# Patient Record
Sex: FEMALE | Race: ASIAN | NOT HISPANIC OR LATINO
[De-identification: names, ages, dates, MRNs, and addresses within clinical notes are randomized per-mention and may not be internally consistent; named-entity substitution may affect disease eponyms.]

---

## 2022-04-19 DIAGNOSIS — Z78.9 OTHER SPECIFIED HEALTH STATUS: ICD-10-CM

## 2022-04-19 DIAGNOSIS — N75.1 ABSCESS OF BARTHOLIN'S GLAND: ICD-10-CM

## 2022-04-19 DIAGNOSIS — N87.0 MILD CERVICAL DYSPLASIA: ICD-10-CM

## 2022-04-19 DIAGNOSIS — N92.5 OTHER SPECIFIED IRREGULAR MENSTRUATION: ICD-10-CM

## 2022-04-19 DIAGNOSIS — Z86.19 PERSONAL HISTORY OF OTHER INFECTIOUS AND PARASITIC DISEASES: ICD-10-CM

## 2022-04-19 PROBLEM — Z00.00 ENCOUNTER FOR PREVENTIVE HEALTH EXAMINATION: Status: ACTIVE | Noted: 2022-04-19

## 2022-04-21 ENCOUNTER — APPOINTMENT (OUTPATIENT)
Dept: OBGYN | Facility: CLINIC | Age: 22
End: 2022-04-21
Payer: COMMERCIAL

## 2022-04-21 VITALS
TEMPERATURE: 98.2 F | OXYGEN SATURATION: 98 % | WEIGHT: 86 LBS | BODY MASS INDEX: 15.24 KG/M2 | HEIGHT: 63 IN | RESPIRATION RATE: 16 BRPM | HEART RATE: 96 BPM | SYSTOLIC BLOOD PRESSURE: 106 MMHG | DIASTOLIC BLOOD PRESSURE: 82 MMHG

## 2022-04-21 DIAGNOSIS — Z01.419 ENCOUNTER FOR GYNECOLOGICAL EXAMINATION (GENERAL) (ROUTINE) W/OUT ABNORMAL FINDINGS: ICD-10-CM

## 2022-04-21 DIAGNOSIS — R87.622 LOW GRADE SQUAMOUS INTRAEPITHELIAL LESION ON CYTOLOGIC SMEAR OF VAGINA (LGSIL): ICD-10-CM

## 2022-04-21 PROCEDURE — 99213 OFFICE O/P EST LOW 20 MIN: CPT | Mod: 25

## 2022-04-21 NOTE — HISTORY OF PRESENT ILLNESS
[FreeTextEntry1] : 22 year G 0  \par LMP: 04/041/2021  Regular Menses\par No medication\par Birth control  Xulane patch. \par Sexually active with same partner.\par No complaints. \par Non smoker\par

## 2022-04-25 LAB
C TRACH RRNA SPEC QL NAA+PROBE: NOT DETECTED
N GONORRHOEA RRNA SPEC QL NAA+PROBE: NOT DETECTED
SOURCE TP AMPLIFICATION: NORMAL

## 2022-04-26 LAB — CYTOLOGY CVX/VAG DOC THIN PREP: ABNORMAL

## 2022-10-08 ENCOUNTER — EMERGENCY (EMERGENCY)
Facility: HOSPITAL | Age: 22
LOS: 1 days | Discharge: HOME | End: 2022-10-08
Attending: EMERGENCY MEDICINE | Admitting: EMERGENCY MEDICINE

## 2022-10-08 VITALS
TEMPERATURE: 98 F | HEART RATE: 89 BPM | DIASTOLIC BLOOD PRESSURE: 74 MMHG | SYSTOLIC BLOOD PRESSURE: 112 MMHG | RESPIRATION RATE: 16 BRPM | OXYGEN SATURATION: 100 %

## 2022-10-08 PROCEDURE — 99284 EMERGENCY DEPT VISIT MOD MDM: CPT

## 2022-10-08 RX ORDER — LIDOCAINE 4 G/100G
1 CREAM TOPICAL ONCE
Refills: 0 | Status: COMPLETED | OUTPATIENT
Start: 2022-10-08 | End: 2022-10-08

## 2022-10-08 RX ADMIN — LIDOCAINE 1 APPLICATION(S): 4 CREAM TOPICAL at 20:24

## 2022-10-08 NOTE — ED PROVIDER NOTE - OBJECTIVE STATEMENT
23 yo pmhx bartholin cysts presents to ed w one day of bartholin cyst left labia majora. denies fevers, or drainage to the area. pt  follows w dr. henriquez and normally gets them drained at [facility].

## 2022-10-08 NOTE — ED PROVIDER NOTE - PHYSICAL EXAMINATION
GENERAL: well appearing in no acute distress, non-toxic appearing  CARDIAC: regular rate and rhythm, normal S1S2,   PULM: normal breath sounds, clear to ascultation bilaterally, no rales, rhonchi, wheezing  GI: abdomen nondistended, soft, nontender, no guarding, rebound tenderness  : left labia majora bartholin cyst size of small golfball

## 2022-10-08 NOTE — ED PROVIDER NOTE - ATTENDING CONTRIBUTION TO CARE
Pt w/ recurrent bartholin cyst, L labia, does not appear infected, plan for gyn consult for drainage.

## 2022-10-08 NOTE — ED PROVIDER NOTE - CLINICAL SUMMARY MEDICAL DECISION MAKING FREE TEXT BOX
23 yo f no pmhx presents to ed w left labia majora bartholin cyst x1 day, with no drainage or fevers. this is her fifth cyst, has not had prior marsupialization. pt seen by dr. henriquez who was made aware she was here. gyn team will come down and drain

## 2022-10-08 NOTE — ED PROVIDER NOTE - PATIENT PORTAL LINK FT
You can access the FollowMyHealth Patient Portal offered by [facility] by registering at the following website: [email or web address]. By joining [employer]’s FollowMyHealth portal, you will also be able to view your health information using other applications (apps) compatible with our system.

## 2022-10-08 NOTE — ED PROVIDER NOTE - NS ED ROS FT
General: denies fever, chills  CV: denies chest pain, palpitations  Resp: denies difficulty breathing, cough  Abdominal: denies nausea, vomiting, diarrhea, abdominal pain  : bartholin cyst

## 2022-10-08 NOTE — CONSULT NOTE ADULT - SUBJECTIVE AND OBJECTIVE BOX
TIM DEE  22y  Female [id number]    HPI:      Name of GYN Physician:     ObHx:  GynHx: Denies fibroids, cysts, endometriosis, STI's, Abnormal pap smears   PMHx:  SurgHx:  Meds:  Allergies:  Social History:  Denies smoking use, drug use, alcohol use.    Vital Signs Last 24 Hrs  T(C): 36.6 (08 Oct 2022 15:53), Max: 36.6 (08 Oct 2022 15:53)  T(F): 97.9 (08 Oct 2022 15:53), Max: 97.9 (08 Oct 2022 15:53)  HR: 89 (08 Oct 2022 15:53) (89 - 89)  BP: 112/74 (08 Oct 2022 15:53) (112/74 - 112/74)  BP(mean): --  RR: 16 (08 Oct 2022 15:53) (16 - 16)  SpO2: 100% (08 Oct 2022 15:53) (100% - 100%)    Parameters below as of 08 Oct 2022 15:53  Patient On (Oxygen Delivery Method): room air        Physical Exam:   General: sitting comfortably in bed, NAD   HEENT: neck supple, full ROM  CV: RR S1S2 no m/r/g  Lungs: CTA b/l, good air flow b/l   Back: No CVA tenderness  Abd: Soft, non-tender, non-distended.  Bowel sounds present.    :  No bleeding on pad. External labia wnl. Bimanual exam with no cervical motion tenderness, uterus wnl, adnexa non palpable b/l.  Cervix closed vs. Cervix dilated *** cm   Speculum Exam: No active bleeding from os. Physiologic discharge.    Ext: non-tender b/l, no edema     Chaperoned by     LABS:                I&O's Detail          RADIOLOGY & ADDITIONAL STUDIES: TIM DEE  22y  Female [id number]    HPI:  23 y/o G0 LMP 8/3 presenting for recurrent Bartholin's abscess.    Patient states that over the past year she has had "at least 5 Bartholin's cysts" - currently the fourth on the left, and has had one on the right. Has had I&D every time, usually at [facility]. Presents today with feeling of growing cyst on L side of labia, started last night, noting more discomfort today. Denies drainage, difficulty urinating, fevers/chills, abnormal discharge. Otherwise feeling well. Has not needed to take pain medications. States that in between the recurrences she does try sitz baths with no effect.    Name of GYN Physician: Dr Johnson    ObHx: denies  GynHx: Denies fibroids, cysts, endometriosis, STI's, Abnormal pap smears   PMHx: migraines  SurgHx: denies  Meds: Ajovy qmonthly injections for migraines  Allergies: NKDA  Social History:  Denies smoking use, drug use, alcohol use.    Vital Signs Last 24 Hrs  T(C): 36.6 (08 Oct 2022 15:53), Max: 36.6 (08 Oct 2022 15:53)  T(F): 97.9 (08 Oct 2022 15:53), Max: 97.9 (08 Oct 2022 15:53)  HR: 89 (08 Oct 2022 15:53) (89 - 89)  BP: 112/74 (08 Oct 2022 15:53) (112/74 - 112/74)  BP(mean): --  RR: 16 (08 Oct 2022 15:53) (16 - 16)  SpO2: 100% (08 Oct 2022 15:53) (100% - 100%)    Parameters below as of 08 Oct 2022 15:53  Patient On (Oxygen Delivery Method): room air    Physical Exam:   General: sitting comfortably in bed, NAD   Abd: Soft, non-tender, non-distended.  :  No bleeding on pad. External labia wnl. 1.5cm slightly fluctuant cyst noted at 5 o;clock on L labia  Ext: non-tender b/l, no edema

## 2022-10-08 NOTE — CONSULT NOTE ADULT - ASSESSMENT
***INCOMPLETE PT TO BE SEEN*** 21 y/o G0 LMP 8/3 presenting for recurrent Bartholin's gland cyst.    #Bartholin's Cyst  - Incision and drainage in ED with attending Dr Jean; pt given local anesthetic with 2% lidocaine with epinephrine, minimal expression of white fluid, wound culture taken; patient tolerated well  - f/u outpatient with Dr Johnson  - No other acute Gyn intervention, primary management per ED    Patient seen and examined with attending Dr Ted Viveros  PGY-2

## 2022-10-10 LAB
CULTURE RESULTS: SIGNIFICANT CHANGE UP
SPECIMEN SOURCE: SIGNIFICANT CHANGE UP

## 2022-10-15 ENCOUNTER — LABORATORY RESULT (OUTPATIENT)
Age: 22
End: 2022-10-15

## 2022-10-15 ENCOUNTER — APPOINTMENT (OUTPATIENT)
Dept: OBGYN | Facility: CLINIC | Age: 22
End: 2022-10-15

## 2022-10-15 VITALS
DIASTOLIC BLOOD PRESSURE: 76 MMHG | BODY MASS INDEX: 14.53 KG/M2 | HEART RATE: 84 BPM | OXYGEN SATURATION: 100 % | TEMPERATURE: 98 F | SYSTOLIC BLOOD PRESSURE: 110 MMHG | HEIGHT: 63 IN | WEIGHT: 82 LBS | RESPIRATION RATE: 16 BRPM

## 2022-10-15 PROCEDURE — 99213 OFFICE O/P EST LOW 20 MIN: CPT | Mod: 25

## 2022-10-15 NOTE — HISTORY OF PRESENT ILLNESS
[FreeTextEntry1] : TIM DEE 23 YO, presents for Repeat pap & Hospital Follow up\par  G 0\par LMP: 08/03/2022   Irregular Menses\par Medication- Ajovy for Migraines\par Sexually active with same partner using condoms. \par Last PAP-LGSIL\par Visited [facility]- ED on 10/08 for Bartholin abscess. \par Had I/D with Dr. Barrera. \par No complaints. \par

## 2022-10-21 PROBLEM — Z78.9 OTHER SPECIFIED HEALTH STATUS: Chronic | Status: ACTIVE | Noted: 2022-10-08

## 2022-10-27 LAB — CYTOLOGY CVX/VAG DOC THIN PREP: ABNORMAL

## 2022-10-28 ENCOUNTER — APPOINTMENT (OUTPATIENT)
Dept: OBGYN | Facility: CLINIC | Age: 22
End: 2022-10-28

## 2022-10-28 VITALS
RESPIRATION RATE: 16 BRPM | HEART RATE: 79 BPM | DIASTOLIC BLOOD PRESSURE: 81 MMHG | WEIGHT: 82 LBS | OXYGEN SATURATION: 100 % | TEMPERATURE: 98 F | BODY MASS INDEX: 14.53 KG/M2 | SYSTOLIC BLOOD PRESSURE: 118 MMHG | HEIGHT: 63 IN

## 2022-10-28 DIAGNOSIS — R87.612 LOW GRADE SQUAMOUS INTRAEPITHELIAL LESION ON CYTOLOGIC SMEAR OF CERVIX (LGSIL): ICD-10-CM

## 2022-10-28 PROCEDURE — 57454 BX/CURETT OF CERVIX W/SCOPE: CPT

## 2022-10-28 PROCEDURE — 99213 OFFICE O/P EST LOW 20 MIN: CPT | Mod: 25

## 2022-10-28 NOTE — HISTORY OF PRESENT ILLNESS
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 \par NG & CT- Not detected. \par No complaints. \par Procedure discussed. \par \par

## 2022-10-28 NOTE — PROCEDURE
[Colposcopy] : Colposcopy  [Risks] : risks [Benefits] : benefits [Alternatives] : alternatives [Patient] : patient [Abnormal Pap] : abnormal pap [HPV High Risk] : HPV high risk [No Premedication] : no premedication [No Abnormalities] : no abnormalities [Lesion] : lesion seen [Biopsy] : biopsy taken [de-identified] : 2 [de-identified] : WE seen @ 3 & 9 o'clock position

## 2022-11-03 LAB — CORE LAB BIOPSY: NORMAL

## 2023-09-05 ENCOUNTER — EMERGENCY (EMERGENCY)
Facility: HOSPITAL | Age: 23
LOS: 1 days | Discharge: HOME | End: 2023-09-05
Attending: STUDENT IN AN ORGANIZED HEALTH CARE EDUCATION/TRAINING PROGRAM | Admitting: STUDENT IN AN ORGANIZED HEALTH CARE EDUCATION/TRAINING PROGRAM
Payer: COMMERCIAL

## 2023-09-05 VITALS
DIASTOLIC BLOOD PRESSURE: 74 MMHG | TEMPERATURE: 98 F | RESPIRATION RATE: 18 BRPM | HEART RATE: 113 BPM | SYSTOLIC BLOOD PRESSURE: 130 MMHG | OXYGEN SATURATION: 100 %

## 2023-09-05 VITALS — HEART RATE: 89 BPM

## 2023-09-05 LAB
ALBUMIN SERPL ELPH-MCNC: 4.3 G/DL — SIGNIFICANT CHANGE UP (ref 3.3–5)
ALP SERPL-CCNC: 56 U/L — SIGNIFICANT CHANGE UP (ref 40–120)
ALT FLD-CCNC: 9 U/L — SIGNIFICANT CHANGE UP (ref 4–33)
ANION GAP SERPL CALC-SCNC: 15 MMOL/L — HIGH (ref 7–14)
APTT BLD: 30.8 SEC — SIGNIFICANT CHANGE UP (ref 24.5–35.6)
AST SERPL-CCNC: 15 U/L — SIGNIFICANT CHANGE UP (ref 4–32)
BASOPHILS # BLD AUTO: 0.02 K/UL — SIGNIFICANT CHANGE UP (ref 0–0.2)
BASOPHILS NFR BLD AUTO: 0.2 % — SIGNIFICANT CHANGE UP (ref 0–2)
BILIRUB SERPL-MCNC: 0.4 MG/DL — SIGNIFICANT CHANGE UP (ref 0.2–1.2)
BLD GP AB SCN SERPL QL: NEGATIVE — SIGNIFICANT CHANGE UP
BUN SERPL-MCNC: 13 MG/DL — SIGNIFICANT CHANGE UP (ref 7–23)
CALCIUM SERPL-MCNC: 9.1 MG/DL — SIGNIFICANT CHANGE UP (ref 8.4–10.5)
CHLORIDE SERPL-SCNC: 100 MMOL/L — SIGNIFICANT CHANGE UP (ref 98–107)
CO2 SERPL-SCNC: 22 MMOL/L — SIGNIFICANT CHANGE UP (ref 22–31)
CREAT SERPL-MCNC: 0.58 MG/DL — SIGNIFICANT CHANGE UP (ref 0.5–1.3)
EGFR: 130 ML/MIN/1.73M2 — SIGNIFICANT CHANGE UP
EOSINOPHIL # BLD AUTO: 0 K/UL — SIGNIFICANT CHANGE UP (ref 0–0.5)
EOSINOPHIL NFR BLD AUTO: 0 % — SIGNIFICANT CHANGE UP (ref 0–6)
GLUCOSE SERPL-MCNC: 108 MG/DL — HIGH (ref 70–99)
HCT VFR BLD CALC: 36 % — SIGNIFICANT CHANGE UP (ref 34.5–45)
HGB BLD-MCNC: 11.7 G/DL — SIGNIFICANT CHANGE UP (ref 11.5–15.5)
IANC: 7.28 K/UL — SIGNIFICANT CHANGE UP (ref 1.8–7.4)
IMM GRANULOCYTES NFR BLD AUTO: 0.8 % — SIGNIFICANT CHANGE UP (ref 0–0.9)
INR BLD: 1.1 RATIO — SIGNIFICANT CHANGE UP (ref 0.85–1.18)
LYMPHOCYTES # BLD AUTO: 1.38 K/UL — SIGNIFICANT CHANGE UP (ref 1–3.3)
LYMPHOCYTES # BLD AUTO: 14.9 % — SIGNIFICANT CHANGE UP (ref 13–44)
MCHC RBC-ENTMCNC: 28 PG — SIGNIFICANT CHANGE UP (ref 27–34)
MCHC RBC-ENTMCNC: 32.5 GM/DL — SIGNIFICANT CHANGE UP (ref 32–36)
MCV RBC AUTO: 86.1 FL — SIGNIFICANT CHANGE UP (ref 80–100)
MONOCYTES # BLD AUTO: 0.51 K/UL — SIGNIFICANT CHANGE UP (ref 0–0.9)
MONOCYTES NFR BLD AUTO: 5.5 % — SIGNIFICANT CHANGE UP (ref 2–14)
NEUTROPHILS # BLD AUTO: 7.28 K/UL — SIGNIFICANT CHANGE UP (ref 1.8–7.4)
NEUTROPHILS NFR BLD AUTO: 78.6 % — HIGH (ref 43–77)
NRBC # BLD: 0 /100 WBCS — SIGNIFICANT CHANGE UP (ref 0–0)
NRBC # FLD: 0 K/UL — SIGNIFICANT CHANGE UP (ref 0–0)
PLATELET # BLD AUTO: 198 K/UL — SIGNIFICANT CHANGE UP (ref 150–400)
POTASSIUM SERPL-MCNC: 3.5 MMOL/L — SIGNIFICANT CHANGE UP (ref 3.5–5.3)
POTASSIUM SERPL-SCNC: 3.5 MMOL/L — SIGNIFICANT CHANGE UP (ref 3.5–5.3)
PROT SERPL-MCNC: 7.6 G/DL — SIGNIFICANT CHANGE UP (ref 6–8.3)
PROTHROM AB SERPL-ACNC: 12.3 SEC — SIGNIFICANT CHANGE UP (ref 9.5–13)
RBC # BLD: 4.18 M/UL — SIGNIFICANT CHANGE UP (ref 3.8–5.2)
RBC # FLD: 12.8 % — SIGNIFICANT CHANGE UP (ref 10.3–14.5)
RH IG SCN BLD-IMP: POSITIVE — SIGNIFICANT CHANGE UP
SODIUM SERPL-SCNC: 137 MMOL/L — SIGNIFICANT CHANGE UP (ref 135–145)
WBC # BLD: 9.26 K/UL — SIGNIFICANT CHANGE UP (ref 3.8–10.5)
WBC # FLD AUTO: 9.26 K/UL — SIGNIFICANT CHANGE UP (ref 3.8–10.5)

## 2023-09-05 PROCEDURE — 99285 EMERGENCY DEPT VISIT HI MDM: CPT

## 2023-09-05 PROCEDURE — 10060 I&D ABSCESS SIMPLE/SINGLE: CPT

## 2023-09-05 RX ORDER — MORPHINE SULFATE 50 MG/1
4 CAPSULE, EXTENDED RELEASE ORAL ONCE
Refills: 0 | Status: DISCONTINUED | OUTPATIENT
Start: 2023-09-05 | End: 2023-09-05

## 2023-09-05 RX ORDER — IBUPROFEN 200 MG
600 TABLET ORAL ONCE
Refills: 0 | Status: COMPLETED | OUTPATIENT
Start: 2023-09-05 | End: 2023-09-05

## 2023-09-05 RX ADMIN — MORPHINE SULFATE 4 MILLIGRAM(S): 50 CAPSULE, EXTENDED RELEASE ORAL at 03:20

## 2023-09-05 RX ADMIN — Medication 600 MILLIGRAM(S): at 03:30

## 2023-09-05 NOTE — PROCEDURE NOTE - ADDITIONAL PROCEDURE DETAILS
Approx 30cc blood clot and bloody fluid drained with scant purulent material. Likely L labial hematoma secondary to prior I&D on 9/1 Approx 30cc blood clot and bloody fluid drained with scant purulent material. Likely L labial hematoma secondary to prior I&D on 9/1    Attending Attestation:  I was present with resident during the performance of the critical or key portion(s) of the services rendered and was directly involved in the management of the patient. I discussed the case with the resident and agree with the findings and plan as documented in the resident’s note.  Elton Veras M.D.

## 2023-09-05 NOTE — ED ADULT TRIAGE NOTE - PATIENT ON (OXYGEN DELIVERY METHOD)
room air Composite Graft Text: The defect edges were debeveled with a #15 scalpel blade.  Given the location of the defect, shape of the defect, the proximity to free margins and the fact the defect was full thickness a composite graft was deemed most appropriate.  The defect was outline and then transferred to the donor site.  A full thickness graft was then excised from the donor site. The graft was then placed in the primary defect, oriented appropriately and then sutured into place.  The secondary defect was then repaired using a primary closure.

## 2023-09-05 NOTE — ED PROVIDER NOTE - PROGRESS NOTE DETAILS
Cedrick Ricks MD: GYN came down and excised the cyst.  We are sending off a culture for analysis.  She is going to follow-up with her primary OB/GYN for a referral to a surgeon for possible gland removal.

## 2023-09-05 NOTE — ED ADULT NURSE NOTE - OBJECTIVE STATEMENT
23 year old female A*Ox4 with history of bartholin cyst  c/o recurrent left sided  bartholinian cyst. Pt was recently seen at [facility] on Friday where she had I & D done. Endorses low grade fever. Denies vaginal bleeding or discharge. Glandon the left side visible enlarged upon examination by Md Becker. Mother at bedside, call bell in reach, SARWAT, primary RN Ca at bedside doing labs and medications, pt awaiting GYN consult.

## 2023-09-05 NOTE — ED PROVIDER NOTE - ATTENDING CONTRIBUTION TO CARE
I have personally performed a history and physical examination of the patient and discussed management with the resident as well as the patient.  I reviewed the resident's note and agree with the documented findings and plan of care.  I have authored and modified critical sections of the Provider Note, including but not limited to HPI, Physical Exam and MDM.    HPI: 23-year-old female past medical history of recurrent Bartholin cysts presents for vaginal pain due to Bartholin cyst.  Patient had I&D 4 days ago at [facility] for similar symptoms.  Reports worsening pain.  No recent fevers or chills.  States no Word catheter placed at that time.  No medications taken for current symptoms.  No other current complaints this time.    ROS:   General: No fever, no chills, no malaise, no fatigue  Respiratory: No cough, no dyspnea, no pleuritic chest pain  Cardiac: no chest pain, no palpitations, no edema, no jvd  Abdomen: No abdominal pain, no nausea, no vomiting, no diarrhea  : No dysuria, see hpi  Musculoskeletal: No myalgia, no arthralgia  Neurologic: No headache, no vertigo, no paresthesia, no focal deficits, no diplopia  Skin: No rash, no evidence of trauma  All other ROS are negative    PE:  General: Uncomfortable, A&O x3   Head: NC/AT  Eyes: PERRL, EOMI  ENT: Airway patent, mmm  Abdomen: +BS, ND, NT, soft, no guarding, no rebound, no masses , no rigidity  : Chaperone JAMARI Navarro Michi: 4cm x1cm batrholin cyst on the L lower labia, significant TTP. No vaginal discharge.  Back: Normal  spine  Extremities: FROM  Skin: no rash or bruising  Neurologic: alert, speech clear, no focal deficits  Psych: nl mood/affect, nl insight.    MDM: 23-year-old female past medical history of recurrent Bartholin cysts presents for vaginal pain due to Bartholin cyst.  Patient had I&D 4 days ago at [facility] for similar symptoms.  States no Word catheter placed at that time.  Case discussed with OB/GYN for Bartholin cyst intervention/drainage.  Patient states she is been discussing possible gland removal surgically however no planning has proceeded at this time.  Will obtain CBC, CMP for low suspicion setting infection.  Symptomatic control as needed.

## 2023-09-05 NOTE — ED ADULT TRIAGE NOTE - CHIEF COMPLAINT QUOTE
bartholin cyst    pt had i&d of bartholin cyst on Friday at [facility].. reports worsening pain.  has had 4 cysts in past.. states never had pain like this before.  appears uncomfortable. denies past medical history

## 2023-09-05 NOTE — ED PROVIDER NOTE - PATIENT PORTAL LINK FT
You can access the FollowMyHealth Patient Portal offered by [facility] by registering at the following website: [email or web address]. By joining [employer]’s FollowMyHealth portal, you will also be able to view your health information using other applications (apps) compatible with our system.

## 2023-09-05 NOTE — ED ADULT NURSE NOTE - NSFALLUNIVINTERV_ED_ALL_ED
Bed/Stretcher in lowest position, wheels locked, appropriate side rails in place/Call bell, personal items and telephone in reach/Instruct patient to call for assistance before getting out of bed/chair/stretcher/Non-slip footwear applied when patient is off stretcher/[place] to call system/Physically safe environment - no spills, clutter or unnecessary equipment/Purposeful proactive rounding/Room/bathroom lighting operational, light cord in reach

## 2023-09-05 NOTE — CONSULT NOTE ADULT - ASSESSMENT
Patient is a 24yo G0 presenting for left labial swelling and pain in the setting of L Bartholin gland abscess drainage 4 days ago (9/1) at [facility]. On presentation to ED,  Patient is a 22yo G0 presenting for left labial swelling and pain in the setting of L Bartholin gland abscess drainage 4 days ago (9/1) at [facility]. On presentation to ED, patient tachycardic to 110s likely due to discomfort, other vital signs wnl. Patient received Morphine and  Ibuprofen with persistent discomfort. L labial Bartholin gland incised and drained with copious blood clot evacuation, indicating likely hematoma formation.    - S/p bedside incision and drainage with copious blood clot evacuation, scant purulence, please see separate procedure note for additional details  - Patient counseled on the likely need for definitive surgical management to be scheduled on an outpatient basis. Recommended she inquire with primary GYN for referral for provider who routinely performs the indicated procedure  - No need for antibiotics at this time  - Patient cleared for discharge to home from GYN perspective with outpatient GYN f/u in 1-2 weeks with primary GYN    Patient seen at bedside with GYN service attending, Dr. Eda Nix PGY2 Patient is a 24yo G0 presenting for left labial swelling and pain in the setting of L Bartholin gland abscess drainage 4 days ago (9/1) at [facility]. On presentation to ED, patient tachycardic to 110s likely due to discomfort, other vital signs wnl. Patient received Morphine and  Ibuprofen with persistent discomfort. L labial Bartholin gland incised and drained with copious blood clot evacuation, indicating likely hematoma formation, possibly secondary to prior I&D 4 days ago.    - S/p bedside incision and drainage with copious blood clot evacuation, scant purulence, please see separate procedure note for additional details  - Patient counseled on the likely need for definitive surgical management to be scheduled on an outpatient basis. Recommended she inquire with primary GYN for referral for provider who routinely performs the indicated procedure  - No need for antibiotics at this time  - Patient cleared for discharge to home from GYN perspective with outpatient GYN f/u in 1-2 weeks with primary GYN    Patient seen at bedside with GYN service attending, Dr. Eda Nix PGY2 Patient is a 22yo G0 presenting for left labial swelling and pain in the setting of L Bartholin gland abscess drainage 4 days ago (9/1) at [facility]. On presentation to ED, patient tachycardic to 110s likely due to discomfort, other vital signs wnl. Patient received Morphine and  Ibuprofen with persistent discomfort. L labial Bartholin gland incised and drained with copious blood clot evacuation, indicating likely hematoma formation, possibly secondary to prior I&D 4 days ago.    - S/p bedside incision and drainage with copious blood clot evacuation, scant purulence, please see separate procedure note for additional details  - Patient counseled on the likely need for definitive surgical management to be scheduled on an outpatient basis. Recommended she inquire with primary GYN for referral for provider who routinely performs the indicated procedure  - No need for antibiotics at this time  - Patient cleared for discharge to home from GYN perspective with outpatient GYN f/u in 1-2 weeks with primary GYN    Patient seen at bedside with GYN service attending, Dr. Eda Nix PGY2    22y/o G0 w/ L Bartholin gland abscess Patient is a 24yo G0 presenting for left labial swelling and pain in the setting of L Bartholin gland abscess drainage 4 days ago (9/1) at [facility]. On presentation to ED, patient tachycardic to 110s likely due to discomfort, other vital signs wnl. Patient received Morphine and  Ibuprofen with persistent discomfort. L labial Bartholin gland incised and drained with copious blood clot evacuation, indicating likely hematoma formation, possibly secondary to prior I&D 4 days ago.    - S/p bedside incision and drainage with copious blood clot evacuation, scant purulence, please see separate procedure note for additional details  - Patient counseled on the likely need for definitive surgical management to be scheduled on an outpatient basis. Recommended she inquire with primary GYN for referral for provider who routinely performs the indicated procedure  - No need for antibiotics at this time. Wound culture obtained and will be followed up by GYN team  - Patient cleared for discharge to home from GYN perspective with outpatient GYN f/u in 1-2 weeks with primary GYN    Patient seen at bedside with GYN service attending, Dr. Eda Nix PGY2    24y/o G0 w/ L Bartholin gland abscess

## 2023-09-05 NOTE — ED PROVIDER NOTE - NSFOLLOWUPINSTRUCTIONS_ED_ALL_ED_FT
You were seen in the emergency department for a bartholin gland cyst. We have evaluated you and determined that you do not require further hospital interventions.    During your stay you had the following relevant results: Lab work that shows normal electrolytes, no evidence of infection    Please follow up with your OB/GYN to discuss the results of your stay in our department.    You may continue to experience pain after being discharged.  For your pain, you can take 2 tablets (1000 mg) of acetaminophen (brand name Tylenol®) every 6 hours.  If you still have pain, you can also take 2 tablets (400 mg) of ibuprofen (brand names Motrin® or Advil®) every 6 hours.  For better pain control, it is recommended that you alternate these medications every 3 hours.  You should not take more than 4 doses of each medication in a 24-hour period.    If you start to experience worsening symptoms such as Severe pain, fevers or chills, nausea or vomiting, please return to the emergency department for further evaluation.

## 2023-09-05 NOTE — ED ADULT NURSE REASSESSMENT NOTE - NS ED NURSE REASSESS COMMENT FT1
pt A&ox4, c/o pain. pt appears uncomfortable in stretcher. 20G to LAC, labs drawn and sent. medicated as per MD orders. pt denies possibility of being pregnant at this time. respirations even and unlabored. GYN at bedside. awaiting recommendations. safety maintained, mother at bedside

## 2023-09-05 NOTE — CONSULT NOTE ADULT - SUBJECTIVE AND OBJECTIVE BOX
TIM DEE  23y  Female [id number]    HPI:        Name of GYN Physician:   OBHx:    GynHx: Denies fibroids, cysts, endometriosis, STI's, Abnormal pap smears   PMH:  PSH:  Meds:  Allx:  Social History:  Denies smoking use, drug use, alcohol use.   +occasional social alcohol use    Vital Signs Last 24 Hrs  T(C): 36.7 (05 Sep 2023 01:28), Max: 36.7 (05 Sep 2023 01:28)  T(F): 98.1 (05 Sep 2023 01:28), Max: 98.1 (05 Sep 2023 01:28)  HR: 113 (05 Sep 2023 01:28) (113 - 113)  BP: 130/74 (05 Sep 2023 01:28) (130/74 - 130/74)  RR: 18 (05 Sep 2023 01:28) (18 - 18)  SpO2: 100% (05 Sep 2023 01:28) (100% - 100%)    Parameters below as of 05 Sep 2023 01:28  Patient On (Oxygen Delivery Method): room air    Physical Exam:   General: sitting comfortably in bed, NAD   HEENT: neck supple, full ROM  CV: extremities well perfused  Lungs: normal respiratory effort on room air  Back: No CVA tenderness  Abd: Soft, non-tender, non-distended  :  No bleeding on pad.  External labia wnl. Bimanual exam with no cervical motion tenderness, uterus wnl, adnexa non palpable b/l.  Cervix closed vs. Cervix dilated  Speculum Exam: No active bleeding from os.  Ext: non-tender b/l, no edema      TIM DEE  23y  Female [id number]    HPI: Patient is a 22yo G0 presenting for left labial swelling and pain in the setting of L Bartholin gland abscess drainage 4 days ago (9/1) at [facility]. She first noticed L labial swelling on Thursday night, presented to [place] on Friday for drainage, noting that the drainage was mostly bloody. Has continued to drain scant blood, no pus per patient. She felt on Sunday that her sxs were worsening again with recurrent swelling. Tried Sitz baths x2 with little relief. Denies fever, chills, vaginal bleeding.    Patient has history of recurrent Bartholin gland abscesses s/p multiple I&Ds, has never had a word catheter placement or marsupialization procedure. Reports 4 prior abscesses of L bartholin gland and 1 prior of R bartholin gland. Has discussed whole gland excision and Marsupialization with new GYN, Dr. Lunsford, who told patient she does not routinely perform either procedure. Patient previously followed with Dr. Johnson; however, established care with Dr. Lunsford due to office location convenience.    Name of GYN Physician: Dr. Cem Lunsford  OBHx: G0  GynHx: PCOS, Denies fibroids, endometriosis, STI's, Abnormal pap smears   PMH: migraines  PSH: denies  Meds: Ajovy monthly injections for migraines  Allx: NKDA  Social History: Denies smoking use, drug use, alcohol use    Vital Signs Last 24 Hrs  T(C): 36.7 (05 Sep 2023 01:28), Max: 36.7 (05 Sep 2023 01:28)  T(F): 98.1 (05 Sep 2023 01:28), Max: 98.1 (05 Sep 2023 01:28)  HR: 113 (05 Sep 2023 01:28) (113 - 113)  BP: 130/74 (05 Sep 2023 01:28) (130/74 - 130/74)  RR: 18 (05 Sep 2023 01:28) (18 - 18)  SpO2: 100% (05 Sep 2023 01:28) (100% - 100%)    Parameters below as of 05 Sep 2023 01:28  Patient On (Oxygen Delivery Method): room air    Physical Exam:   General: laying in bed uncomfortable appearing   HEENT: neck supple, full ROM  CV: extremities well perfused  Lungs: normal respiratory effort on room air  : Scant bleeding on pad. L labia swollen to size of 5x4cm, with 2x1cm region of fluctuance at 5:00, no spontaneous drainage. Extremely tender to palpation.  Ext: non-tender b/l, no edema

## 2023-09-05 NOTE — ED ADULT NURSE NOTE - CHIEF COMPLAINT QUOTE
bartholin cyst    pt had i&d of bartholin cyst on Friday at [facility].. reports worsening pain.  has had 4 cysts in past.. states never had pain like this before.  appears uncomfortable. denies past medical history

## 2023-09-07 LAB
-  AMIKACIN: SIGNIFICANT CHANGE UP
-  AMOXICILLIN/CLAVULANIC ACID: SIGNIFICANT CHANGE UP
-  AMPICILLIN/SULBACTAM: SIGNIFICANT CHANGE UP
-  AMPICILLIN: SIGNIFICANT CHANGE UP
-  AZTREONAM: SIGNIFICANT CHANGE UP
-  CEFAZOLIN: SIGNIFICANT CHANGE UP
-  CEFEPIME: SIGNIFICANT CHANGE UP
-  CEFOXITIN: SIGNIFICANT CHANGE UP
-  CEFTRIAXONE: SIGNIFICANT CHANGE UP
-  CIPROFLOXACIN: SIGNIFICANT CHANGE UP
-  ERTAPENEM: SIGNIFICANT CHANGE UP
-  GENTAMICIN: SIGNIFICANT CHANGE UP
-  IMIPENEM: SIGNIFICANT CHANGE UP
-  LEVOFLOXACIN: SIGNIFICANT CHANGE UP
-  MEROPENEM: SIGNIFICANT CHANGE UP
-  PIPERACILLIN/TAZOBACTAM: SIGNIFICANT CHANGE UP
-  TOBRAMYCIN: SIGNIFICANT CHANGE UP
-  TRIMETHOPRIM/SULFAMETHOXAZOLE: SIGNIFICANT CHANGE UP
METHOD TYPE: SIGNIFICANT CHANGE UP

## 2023-09-07 NOTE — ED POST DISCHARGE NOTE - RESULT SUMMARY
+ Ecoli on cyst drainage by gyn. No rx in prescription writer. NA on pts phone number.  Call back team to continue to try ot reach

## 2023-09-10 RX ORDER — CEFDINIR 250 MG/5ML
1 POWDER, FOR SUSPENSION ORAL
Qty: 20 | Refills: 0
Start: 2023-09-10 | End: 2023-09-19

## 2023-09-12 LAB
CULTURE RESULTS: SIGNIFICANT CHANGE UP
ORGANISM # SPEC MICROSCOPIC CNT: SIGNIFICANT CHANGE UP
ORGANISM # SPEC MICROSCOPIC CNT: SIGNIFICANT CHANGE UP
SPECIMEN SOURCE: SIGNIFICANT CHANGE UP

## 2024-03-07 ENCOUNTER — APPOINTMENT (OUTPATIENT)
Dept: ANTEPARTUM | Facility: CLINIC | Age: 24
End: 2024-03-07
Payer: COMMERCIAL

## 2024-03-07 ENCOUNTER — ASOB RESULT (OUTPATIENT)
Age: 24
End: 2024-03-07

## 2024-03-07 ENCOUNTER — APPOINTMENT (OUTPATIENT)
Dept: OBGYN | Facility: CLINIC | Age: 24
End: 2024-03-07
Payer: COMMERCIAL

## 2024-03-07 VITALS
SYSTOLIC BLOOD PRESSURE: 124 MMHG | DIASTOLIC BLOOD PRESSURE: 79 MMHG | BODY MASS INDEX: 16.48 KG/M2 | WEIGHT: 93 LBS | HEIGHT: 63 IN

## 2024-03-07 DIAGNOSIS — N92.6 IRREGULAR MENSTRUATION, UNSPECIFIED: ICD-10-CM

## 2024-03-07 PROCEDURE — 36415 COLL VENOUS BLD VENIPUNCTURE: CPT

## 2024-03-07 PROCEDURE — 76801 OB US < 14 WKS SINGLE FETUS: CPT | Mod: 59

## 2024-03-07 PROCEDURE — 76813 OB US NUCHAL MEAS 1 GEST: CPT

## 2024-03-07 PROCEDURE — 99214 OFFICE O/P EST MOD 30 MIN: CPT

## 2024-03-08 LAB
ABO + RH PNL BLD: NORMAL
APPEARANCE: CLEAR
APTT BLD: 30.9 SEC
BACTERIA: ABNORMAL /HPF
BILIRUBIN URINE: NEGATIVE
BLOOD URINE: NEGATIVE
CALCIUM OXALATE CRYSTALS: PRESENT
CAST: 0 /LPF
COLOR: YELLOW
EPITHELIAL CELLS: 4 /HPF
ESTIMATED AVERAGE GLUCOSE: 97 MG/DL
GLUCOSE QUALITATIVE U: 500 MG/DL
GLUCOSE SERPL-MCNC: 84 MG/DL
HBA1C MFR BLD HPLC: 5 %
HCT VFR BLD CALC: 38.3 %
HGB BLD-MCNC: 12 G/DL
INR PPP: 0.93 RATIO
KETONES URINE: NEGATIVE MG/DL
LEUKOCYTE ESTERASE URINE: NEGATIVE
MCHC RBC-ENTMCNC: 28.5 PG
MCHC RBC-ENTMCNC: 31.3 GM/DL
MCV RBC AUTO: 91 FL
MICROSCOPIC-UA: NORMAL
NITRITE URINE: NEGATIVE
PH URINE: 6
PLATELET # BLD AUTO: 226 K/UL
PROTEIN URINE: NEGATIVE MG/DL
PT BLD: 10.5 SEC
RBC # BLD: 4.21 M/UL
RBC # FLD: 13 %
RED BLOOD CELLS URINE: 4 /HPF
REVIEW: NORMAL
SPECIFIC GRAVITY URINE: 1.02
UROBILINOGEN URINE: 0.2 MG/DL
WBC # FLD AUTO: 10.22 K/UL
WHITE BLOOD CELLS URINE: 1 /HPF

## 2024-03-10 LAB
BACTERIA UR CULT: NORMAL
C TRACH RRNA SPEC QL NAA+PROBE: NOT DETECTED
HAV IGM SER QL: NONREACTIVE
HBV CORE IGM SER QL: NONREACTIVE
HBV SURFACE AG SER QL: NONREACTIVE
HCV AB SER QL: NONREACTIVE
HCV AB SER QL: NONREACTIVE
HCV S/CO RATIO: 0.13 S/CO
HCV S/CO RATIO: 0.14 S/CO
HGB A MFR BLD: 97.6 %
HGB A2 MFR BLD: 2.4 %
HGB FRACT BLD-IMP: NORMAL
HIV1+2 AB SPEC QL IA.RAPID: NONREACTIVE
LEAD BLD-MCNC: <1 UG/DL
MEV IGG FLD QL IA: <5 AU/ML
MEV IGG+IGM SER-IMP: NEGATIVE
MUV AB SER-ACNC: POSITIVE
MUV IGG SER QL IA: 37.3 AU/ML
N GONORRHOEA RRNA SPEC QL NAA+PROBE: NOT DETECTED
RUBV IGG FLD-ACNC: 1.4 INDEX
RUBV IGG SER-IMP: POSITIVE
SOURCE AMPLIFICATION: NORMAL
T PALLIDUM AB SER QL IA: NEGATIVE

## 2024-03-12 ENCOUNTER — TRANSCRIPTION ENCOUNTER (OUTPATIENT)
Age: 24
End: 2024-03-12

## 2024-03-12 LAB
FMR1 GENE MUT ANL BLD/T: NORMAL
M TB IFN-G BLD-IMP: NEGATIVE
QUANTIFERON TB PLUS MITOGEN MINUS NIL: >10 IU/ML
QUANTIFERON TB PLUS NIL: 0.04 IU/ML
QUANTIFERON TB PLUS TB1 MINUS NIL: 0.02 IU/ML
QUANTIFERON TB PLUS TB2 MINUS NIL: 0.03 IU/ML

## 2024-03-12 NOTE — PHYSICAL EXAM
[Appropriately responsive] : appropriately responsive [Alert] : alert [No Acute Distress] : no acute distress [Soft] : soft [Non-distended] : non-distended [Non-tender] : non-tender [No Lesions] : no lesions [No HSM] : No HSM [No Mass] : no mass [Oriented x3] : oriented x3

## 2024-03-12 NOTE — HISTORY OF PRESENT ILLNESS
[FreeTextEntry1] : Patient is a 24 year  old . LMP 10/5/23 - patient has h/o PCOS. She had a positive home pregnancy test. She is endorsing occasional nausea and breast tenderness. Patient repots h/o abnl PTT but has not had any bleeding issues.  [Patient reported PAP Smear was normal] : Patient reported PAP Smear was normal [PapSmeardate] : 2/6/24 [TextBox_31] : h/o HPV

## 2024-03-12 NOTE — DISCUSSION/SUMMARY
[FreeTextEntry1] : 24 year old P0 at 11w2d by CRL presenting with amenorrhea -f/u GCT/CT -f/u prenatal blood work, NIPT and coags drawn today -Continue PNV -Routine PNC reviewed including nutrition, exercise and safe medications in pregnancy  -High Risk Conditions: f/u coagulation profile - possible heme referral pending results  -f/u4 weeks for AFP

## 2024-03-13 LAB
CHROMOSOME13 INTERPRETATION: NORMAL
CHROMOSOME13 TEST RESULT: NORMAL
CHROMOSOME18 INTERPRETATION: NORMAL
CHROMOSOME18 TEST RESULT: NORMAL
CHROMOSOME21 INTERPRETATION: NORMAL
CHROMOSOME21 TEST RESULT: NORMAL
FETAL FRACTION: NORMAL
PERFORMANCE AND LIMITATIONS: NORMAL
SEX CHROMOSOME INTERPRETATION: NORMAL
SEX CHROMOSOME TEST RESULT: NORMAL
VERIFI PRENATAL TEST: NOT DETECTED

## 2024-03-14 ENCOUNTER — NON-APPOINTMENT (OUTPATIENT)
Age: 24
End: 2024-03-14

## 2024-03-15 LAB
AR GENE MUT ANL BLD/T: NORMAL
CFTR MUT TESTED BLD/T: NEGATIVE

## 2024-03-18 ENCOUNTER — TRANSCRIPTION ENCOUNTER (OUTPATIENT)
Age: 24
End: 2024-03-18

## 2024-03-18 ENCOUNTER — NON-APPOINTMENT (OUTPATIENT)
Age: 24
End: 2024-03-18

## 2024-04-04 ENCOUNTER — NON-APPOINTMENT (OUTPATIENT)
Age: 24
End: 2024-04-04

## 2024-04-04 ENCOUNTER — APPOINTMENT (OUTPATIENT)
Dept: OBGYN | Facility: CLINIC | Age: 24
End: 2024-04-04
Payer: COMMERCIAL

## 2024-04-04 VITALS
WEIGHT: 99 LBS | DIASTOLIC BLOOD PRESSURE: 72 MMHG | HEIGHT: 63 IN | BODY MASS INDEX: 17.54 KG/M2 | SYSTOLIC BLOOD PRESSURE: 109 MMHG

## 2024-04-04 DIAGNOSIS — Z34.82 ENCOUNTER FOR SUPERVISION OF OTHER NORMAL PREGNANCY, SECOND TRIMESTER: ICD-10-CM

## 2024-04-04 PROCEDURE — 36415 COLL VENOUS BLD VENIPUNCTURE: CPT

## 2024-04-04 PROCEDURE — 0502F SUBSEQUENT PRENATAL CARE: CPT

## 2024-04-10 LAB
AFP MOM: 0.99
AFP VALUE: 40.7 NG/ML
ALPHA FETOPROTEIN SERUM COMMENT: NORMAL
ALPHA FETOPROTEIN SERUM INTERPRETATION: NORMAL
ALPHA FETOPROTEIN SERUM RESULTS: NORMAL
ALPHA FETOPROTEIN SERUM TEST RESULTS: NORMAL
GESTATIONAL AGE BASED ON: NORMAL
GESTATIONAL AGE ON COLLECTION DATE: 15.3 WEEKS
INSULIN DEP DIABETES: NO
MATERNAL AGE AT EDD AFP: 25.7 YR
MULTIPLE GESTATION: NO
OSBR RISK 1 IN: NORMAL
RACE: NORMAL
WEIGHT AFP: 99 LBS

## 2024-05-02 ENCOUNTER — ASOB RESULT (OUTPATIENT)
Age: 24
End: 2024-05-02

## 2024-05-02 ENCOUNTER — APPOINTMENT (OUTPATIENT)
Dept: ANTEPARTUM | Facility: CLINIC | Age: 24
End: 2024-05-02
Payer: COMMERCIAL

## 2024-05-02 ENCOUNTER — APPOINTMENT (OUTPATIENT)
Dept: OBGYN | Facility: CLINIC | Age: 24
End: 2024-05-02
Payer: COMMERCIAL

## 2024-05-02 DIAGNOSIS — O26.892 OTHER SPECIFIED PREGNANCY RELATED CONDITIONS, SECOND TRIMESTER: ICD-10-CM

## 2024-05-02 DIAGNOSIS — R51.9 OTHER SPECIFIED PREGNANCY RELATED CONDITIONS, SECOND TRIMESTER: ICD-10-CM

## 2024-05-02 PROCEDURE — 0502F SUBSEQUENT PRENATAL CARE: CPT

## 2024-05-02 PROCEDURE — 76805 OB US >/= 14 WKS SNGL FETUS: CPT

## 2024-05-02 RX ORDER — METOCLOPRAMIDE 5 MG/1
5 TABLET ORAL TWICE DAILY
Qty: 60 | Refills: 1 | Status: ACTIVE | COMMUNITY
Start: 2024-05-02 | End: 1900-01-01

## 2024-05-14 ENCOUNTER — TRANSCRIPTION ENCOUNTER (OUTPATIENT)
Age: 24
End: 2024-05-14

## 2024-06-06 ENCOUNTER — APPOINTMENT (OUTPATIENT)
Dept: OBGYN | Facility: CLINIC | Age: 24
End: 2024-06-06
Payer: COMMERCIAL

## 2024-06-06 VITALS
HEIGHT: 63 IN | SYSTOLIC BLOOD PRESSURE: 117 MMHG | WEIGHT: 119 LBS | DIASTOLIC BLOOD PRESSURE: 88 MMHG | BODY MASS INDEX: 21.09 KG/M2

## 2024-06-06 DIAGNOSIS — Z34.02 ENCOUNTER FOR SUPERVISION OF NORMAL FIRST PREGNANCY, SECOND TRIMESTER: ICD-10-CM

## 2024-06-06 PROCEDURE — 36415 COLL VENOUS BLD VENIPUNCTURE: CPT

## 2024-06-06 PROCEDURE — 0502F SUBSEQUENT PRENATAL CARE: CPT

## 2024-06-10 ENCOUNTER — TRANSCRIPTION ENCOUNTER (OUTPATIENT)
Age: 24
End: 2024-06-10

## 2024-06-10 DIAGNOSIS — O99.019 ANEMIA COMPLICATING PREGNANCY, UNSPECIFIED TRIMESTER: ICD-10-CM

## 2024-06-10 LAB
GLUCOSE 1H P 50 G GLC PO SERPL-MCNC: 84 MG/DL
HCT VFR BLD CALC: 37.4 %
HGB BLD-MCNC: 11.2 G/DL
MCHC RBC-ENTMCNC: 29 PG
MCHC RBC-ENTMCNC: 29.9 GM/DL
MCV RBC AUTO: 96.9 FL
PLATELET # BLD AUTO: 171 K/UL
RBC # BLD: 3.86 M/UL
RBC # FLD: 15.6 %
WBC # FLD AUTO: 12.74 K/UL

## 2024-06-10 RX ORDER — IRON, FOLIC ACID, CYANOCOBALAMIN, ASCORBIC ACID, AND DOCUSATE SODIUM 90; 1; 12; 120; 50 MG/1; MG/1; UG/1; MG/1; MG/1
90-1 TABLET, FILM COATED ORAL
Qty: 90 | Refills: 3 | Status: ACTIVE | COMMUNITY
Start: 2024-06-10 | End: 1900-01-01

## 2024-06-11 ENCOUNTER — TRANSCRIPTION ENCOUNTER (OUTPATIENT)
Age: 24
End: 2024-06-11

## 2024-06-13 RX ORDER — VITAMIN A ACETATE, .BETA.-CAROTENE, ASCORBIC ACID, CHOLECALCIFEROL, .ALPHA.-TOCOPHEROL ACETATE, DL-, THIAMINE MONONITRATE, RIBOFLAVIN, NIACINAMIDE, PYRIDOXINE HYDROCHLORIDE, FOLIC ACID, CYANOCOBALAMIN, CALCIUM CARBONATE, FERROUS FUMARATE, ZINC OXIDE, CUPRIC OXIDE 3080; 920; 120; 400; 22; 1.84; 3; 20; 10; 1; 12; 200; 27; 25; 2 [IU]/1; [IU]/1; MG/1; [IU]/1; MG/1; MG/1; MG/1; MG/1; MG/1; MG/1; UG/1; MG/1; MG/1; MG/1; MG/1
27-1 TABLET, FILM COATED ORAL DAILY
Qty: 90 | Refills: 3 | Status: ACTIVE | COMMUNITY
Start: 2024-06-12 | End: 1900-01-01

## 2024-06-14 ENCOUNTER — TRANSCRIPTION ENCOUNTER (OUTPATIENT)
Age: 24
End: 2024-06-14

## 2024-06-25 ENCOUNTER — TRANSCRIPTION ENCOUNTER (OUTPATIENT)
Age: 24
End: 2024-06-25

## 2024-07-08 ENCOUNTER — TRANSCRIPTION ENCOUNTER (OUTPATIENT)
Age: 24
End: 2024-07-08

## 2024-07-11 ENCOUNTER — APPOINTMENT (OUTPATIENT)
Dept: OBGYN | Facility: CLINIC | Age: 24
End: 2024-07-11
Payer: COMMERCIAL

## 2024-07-11 VITALS
SYSTOLIC BLOOD PRESSURE: 108 MMHG | DIASTOLIC BLOOD PRESSURE: 74 MMHG | WEIGHT: 134 LBS | BODY MASS INDEX: 23.74 KG/M2 | HEIGHT: 63 IN

## 2024-07-11 PROCEDURE — 0502F SUBSEQUENT PRENATAL CARE: CPT

## 2024-07-15 ENCOUNTER — TRANSCRIPTION ENCOUNTER (OUTPATIENT)
Age: 24
End: 2024-07-15

## 2024-07-28 PROBLEM — N75.1 ABSCESS OF BARTHOLIN'S GLAND: Status: RESOLVED | Noted: 2022-04-19 | Resolved: 2024-07-28

## 2024-07-30 ENCOUNTER — APPOINTMENT (OUTPATIENT)
Dept: ANTEPARTUM | Facility: CLINIC | Age: 24
End: 2024-07-30
Payer: COMMERCIAL

## 2024-07-30 ENCOUNTER — APPOINTMENT (OUTPATIENT)
Dept: OBGYN | Facility: CLINIC | Age: 24
End: 2024-07-30
Payer: COMMERCIAL

## 2024-07-30 ENCOUNTER — ASOB RESULT (OUTPATIENT)
Age: 24
End: 2024-07-30

## 2024-07-30 VITALS
WEIGHT: 142 LBS | DIASTOLIC BLOOD PRESSURE: 80 MMHG | SYSTOLIC BLOOD PRESSURE: 112 MMHG | HEIGHT: 63 IN | BODY MASS INDEX: 25.16 KG/M2

## 2024-07-30 PROCEDURE — 76816 OB US FOLLOW-UP PER FETUS: CPT

## 2024-07-30 PROCEDURE — 76819 FETAL BIOPHYS PROFIL W/O NST: CPT | Mod: 59

## 2024-07-30 PROCEDURE — 0502F SUBSEQUENT PRENATAL CARE: CPT

## 2024-08-13 ENCOUNTER — APPOINTMENT (OUTPATIENT)
Dept: ANTEPARTUM | Facility: CLINIC | Age: 24
End: 2024-08-13

## 2024-08-13 ENCOUNTER — ASOB RESULT (OUTPATIENT)
Age: 24
End: 2024-08-13

## 2024-08-13 ENCOUNTER — APPOINTMENT (OUTPATIENT)
Dept: OBGYN | Facility: CLINIC | Age: 24
End: 2024-08-13

## 2024-08-13 VITALS
HEIGHT: 63 IN | DIASTOLIC BLOOD PRESSURE: 80 MMHG | BODY MASS INDEX: 24.63 KG/M2 | WEIGHT: 139 LBS | SYSTOLIC BLOOD PRESSURE: 123 MMHG

## 2024-08-13 DIAGNOSIS — O40.3XX0 POLYHYDRAMNIOS, THIRD TRIMESTER, NOT APPLICABLE OR UNSPECIFIED: ICD-10-CM

## 2024-08-13 PROCEDURE — 76818 FETAL BIOPHYS PROFILE W/NST: CPT

## 2024-08-13 PROCEDURE — 0502F SUBSEQUENT PRENATAL CARE: CPT

## 2024-08-13 PROCEDURE — ZZZZZ: CPT

## 2024-08-18 LAB
ESTIMATED AVERAGE GLUCOSE: 108 MG/DL
HBA1C MFR BLD HPLC: 5.4 %

## 2024-08-20 ENCOUNTER — ASOB RESULT (OUTPATIENT)
Age: 24
End: 2024-08-20

## 2024-08-20 ENCOUNTER — APPOINTMENT (OUTPATIENT)
Dept: OBGYN | Facility: CLINIC | Age: 24
End: 2024-08-20
Payer: COMMERCIAL

## 2024-08-20 ENCOUNTER — APPOINTMENT (OUTPATIENT)
Dept: ANTEPARTUM | Facility: CLINIC | Age: 24
End: 2024-08-20
Payer: COMMERCIAL

## 2024-08-20 VITALS
DIASTOLIC BLOOD PRESSURE: 82 MMHG | BODY MASS INDEX: 25.52 KG/M2 | SYSTOLIC BLOOD PRESSURE: 123 MMHG | WEIGHT: 144 LBS | HEIGHT: 63 IN

## 2024-08-20 PROCEDURE — 76818 FETAL BIOPHYS PROFILE W/NST: CPT

## 2024-08-20 PROCEDURE — 0502F SUBSEQUENT PRENATAL CARE: CPT

## 2024-08-23 ENCOUNTER — TRANSCRIPTION ENCOUNTER (OUTPATIENT)
Age: 24
End: 2024-08-23

## 2024-08-26 ENCOUNTER — TRANSCRIPTION ENCOUNTER (OUTPATIENT)
Age: 24
End: 2024-08-26

## 2024-08-30 ENCOUNTER — APPOINTMENT (OUTPATIENT)
Dept: OBGYN | Facility: CLINIC | Age: 24
End: 2024-08-30
Payer: COMMERCIAL

## 2024-08-30 ENCOUNTER — APPOINTMENT (OUTPATIENT)
Dept: ANTEPARTUM | Facility: CLINIC | Age: 24
End: 2024-08-30

## 2024-08-30 VITALS
BODY MASS INDEX: 26.05 KG/M2 | HEIGHT: 63 IN | DIASTOLIC BLOOD PRESSURE: 85 MMHG | SYSTOLIC BLOOD PRESSURE: 129 MMHG | WEIGHT: 147 LBS

## 2024-08-30 DIAGNOSIS — Z34.03 ENCOUNTER FOR SUPERVISION OF NORMAL FIRST PREGNANCY, THIRD TRIMESTER: ICD-10-CM

## 2024-08-30 PROCEDURE — 36415 COLL VENOUS BLD VENIPUNCTURE: CPT

## 2024-08-30 PROCEDURE — 0502F SUBSEQUENT PRENATAL CARE: CPT

## 2024-09-03 ENCOUNTER — APPOINTMENT (OUTPATIENT)
Dept: OBGYN | Facility: CLINIC | Age: 24
End: 2024-09-03
Payer: COMMERCIAL

## 2024-09-03 ENCOUNTER — APPOINTMENT (OUTPATIENT)
Dept: ANTEPARTUM | Facility: CLINIC | Age: 24
End: 2024-09-03

## 2024-09-03 VITALS
DIASTOLIC BLOOD PRESSURE: 86 MMHG | HEIGHT: 63 IN | SYSTOLIC BLOOD PRESSURE: 125 MMHG | WEIGHT: 147 LBS | BODY MASS INDEX: 26.05 KG/M2

## 2024-09-03 LAB
CREAT SPEC-SCNC: 53 MG/DL
CREAT/PROT UR: 0.4 RATIO
GP B STREP DNA SPEC QL NAA+PROBE: DETECTED
HCT VFR BLD CALC: 42.6 %
HGB BLD-MCNC: 13.2 G/DL
HIV1+2 AB SPEC QL IA.RAPID: NONREACTIVE
MCHC RBC-ENTMCNC: 29.6 PG
MCHC RBC-ENTMCNC: 31 GM/DL
MCV RBC AUTO: 95.5 FL
PLATELET # BLD AUTO: 159 K/UL
PROT UR-MCNC: 21 MG/DL
RBC # BLD: 4.46 M/UL
RBC # FLD: 14.2 %
SOURCE GBS: NORMAL
WBC # FLD AUTO: 12.27 K/UL

## 2024-09-03 PROCEDURE — 0502F SUBSEQUENT PRENATAL CARE: CPT

## 2024-09-10 ENCOUNTER — APPOINTMENT (OUTPATIENT)
Dept: OBGYN | Facility: CLINIC | Age: 24
End: 2024-09-10

## 2024-09-10 ENCOUNTER — APPOINTMENT (OUTPATIENT)
Dept: ANTEPARTUM | Facility: CLINIC | Age: 24
End: 2024-09-10

## 2024-09-11 ENCOUNTER — APPOINTMENT (OUTPATIENT)
Dept: OBGYN | Facility: CLINIC | Age: 24
End: 2024-09-11
Payer: COMMERCIAL

## 2024-09-11 ENCOUNTER — APPOINTMENT (OUTPATIENT)
Dept: ANTEPARTUM | Facility: CLINIC | Age: 24
End: 2024-09-11
Payer: COMMERCIAL

## 2024-09-11 ENCOUNTER — ASOB RESULT (OUTPATIENT)
Age: 24
End: 2024-09-11

## 2024-09-11 VITALS
WEIGHT: 149 LBS | BODY MASS INDEX: 26.4 KG/M2 | HEIGHT: 63 IN | SYSTOLIC BLOOD PRESSURE: 127 MMHG | DIASTOLIC BLOOD PRESSURE: 85 MMHG

## 2024-09-11 PROCEDURE — 0502F SUBSEQUENT PRENATAL CARE: CPT

## 2024-09-11 PROCEDURE — 76818 FETAL BIOPHYS PROFILE W/NST: CPT | Mod: 59

## 2024-09-11 PROCEDURE — 76816 OB US FOLLOW-UP PER FETUS: CPT

## 2024-09-17 ENCOUNTER — ASOB RESULT (OUTPATIENT)
Age: 24
End: 2024-09-17

## 2024-09-17 ENCOUNTER — APPOINTMENT (OUTPATIENT)
Dept: OBGYN | Facility: CLINIC | Age: 24
End: 2024-09-17
Payer: COMMERCIAL

## 2024-09-17 ENCOUNTER — APPOINTMENT (OUTPATIENT)
Dept: ANTEPARTUM | Facility: CLINIC | Age: 24
End: 2024-09-17
Payer: COMMERCIAL

## 2024-09-17 VITALS
HEIGHT: 63 IN | SYSTOLIC BLOOD PRESSURE: 150 MMHG | BODY MASS INDEX: 26.93 KG/M2 | DIASTOLIC BLOOD PRESSURE: 95 MMHG | WEIGHT: 152 LBS

## 2024-09-17 DIAGNOSIS — N92.6 IRREGULAR MENSTRUATION, UNSPECIFIED: ICD-10-CM

## 2024-09-17 PROCEDURE — 76818 FETAL BIOPHYS PROFILE W/NST: CPT

## 2024-09-17 PROCEDURE — 0502F SUBSEQUENT PRENATAL CARE: CPT

## 2024-09-18 ENCOUNTER — APPOINTMENT (OUTPATIENT)
Dept: ANTEPARTUM | Facility: CLINIC | Age: 24
End: 2024-09-18
Payer: COMMERCIAL

## 2024-09-19 ENCOUNTER — TRANSCRIPTION ENCOUNTER (OUTPATIENT)
Age: 24
End: 2024-09-19

## 2024-09-19 ENCOUNTER — INPATIENT (INPATIENT)
Facility: HOSPITAL | Age: 24
LOS: 3 days | Discharge: HOME | End: 2024-09-23
Attending: OBSTETRICS & GYNECOLOGY | Admitting: OBSTETRICS & GYNECOLOGY
Payer: COMMERCIAL

## 2024-09-19 VITALS
RESPIRATION RATE: 16 BRPM | SYSTOLIC BLOOD PRESSURE: 142 MMHG | HEART RATE: 95 BPM | DIASTOLIC BLOOD PRESSURE: 91 MMHG | TEMPERATURE: 99 F

## 2024-09-19 DIAGNOSIS — O26.899 OTHER SPECIFIED PREGNANCY RELATED CONDITIONS, UNSPECIFIED TRIMESTER: ICD-10-CM

## 2024-09-19 PROBLEM — Z78.9 OTHER SPECIFIED HEALTH STATUS: Chronic | Status: INACTIVE | Noted: 2022-10-08 | Resolved: 2024-09-19

## 2024-09-19 LAB
ALBUMIN SERPL ELPH-MCNC: 3.5 G/DL — SIGNIFICANT CHANGE UP (ref 3.3–5)
ALP SERPL-CCNC: 383 U/L — HIGH (ref 40–120)
ALT FLD-CCNC: 16 U/L — SIGNIFICANT CHANGE UP (ref 4–33)
ANION GAP SERPL CALC-SCNC: 16 MMOL/L — HIGH (ref 7–14)
APPEARANCE UR: ABNORMAL
AST SERPL-CCNC: 21 U/L — SIGNIFICANT CHANGE UP (ref 4–32)
BACTERIA # UR AUTO: ABNORMAL /HPF
BASOPHILS # BLD AUTO: 0.03 K/UL — SIGNIFICANT CHANGE UP (ref 0–0.2)
BASOPHILS NFR BLD AUTO: 0.2 % — SIGNIFICANT CHANGE UP (ref 0–2)
BILIRUB SERPL-MCNC: 0.3 MG/DL — SIGNIFICANT CHANGE UP (ref 0.2–1.2)
BILIRUB UR-MCNC: NEGATIVE — SIGNIFICANT CHANGE UP
BLD GP AB SCN SERPL QL: NEGATIVE — SIGNIFICANT CHANGE UP
BUN SERPL-MCNC: 10 MG/DL — SIGNIFICANT CHANGE UP (ref 7–23)
CALCIUM SERPL-MCNC: 8.8 MG/DL — SIGNIFICANT CHANGE UP (ref 8.4–10.5)
CAST: 0 /LPF — SIGNIFICANT CHANGE UP (ref 0–4)
CHLORIDE SERPL-SCNC: 104 MMOL/L — SIGNIFICANT CHANGE UP (ref 98–107)
CO2 SERPL-SCNC: 18 MMOL/L — LOW (ref 22–31)
COLOR SPEC: YELLOW — SIGNIFICANT CHANGE UP
COMMENT - URINE 2: SIGNIFICANT CHANGE UP
COMMENT - URINE: SIGNIFICANT CHANGE UP
CREAT ?TM UR-MCNC: 86 MG/DL — SIGNIFICANT CHANGE UP
CREAT SERPL-MCNC: 0.6 MG/DL — SIGNIFICANT CHANGE UP (ref 0.5–1.3)
CREAT SPEC-SCNC: 171 MG/DL
CREAT/PROT UR: 1.7 RATIO
DIFF PNL FLD: ABNORMAL
EGFR: 128 ML/MIN/1.73M2 — SIGNIFICANT CHANGE UP
EOSINOPHIL # BLD AUTO: 0.14 K/UL — SIGNIFICANT CHANGE UP (ref 0–0.5)
EOSINOPHIL NFR BLD AUTO: 1 % — SIGNIFICANT CHANGE UP (ref 0–6)
EPI CELLS # UR: PRESENT
GLUCOSE SERPL-MCNC: 94 MG/DL — SIGNIFICANT CHANGE UP (ref 70–99)
GLUCOSE UR QL: NEGATIVE MG/DL — SIGNIFICANT CHANGE UP
HCT VFR BLD CALC: 39.9 % — SIGNIFICANT CHANGE UP (ref 34.5–45)
HGB BLD-MCNC: 12.8 G/DL — SIGNIFICANT CHANGE UP (ref 11.5–15.5)
IANC: 10.91 K/UL — HIGH (ref 1.8–7.4)
IMM GRANULOCYTES NFR BLD AUTO: 0.8 % — SIGNIFICANT CHANGE UP (ref 0–0.9)
KETONES UR-MCNC: NEGATIVE MG/DL — SIGNIFICANT CHANGE UP
LDH SERPL L TO P-CCNC: 238 U/L — HIGH (ref 135–225)
LEUKOCYTE ESTERASE UR-ACNC: NEGATIVE — SIGNIFICANT CHANGE UP
LYMPHOCYTES # BLD AUTO: 16.4 % — SIGNIFICANT CHANGE UP (ref 13–44)
LYMPHOCYTES # BLD AUTO: 2.36 K/UL — SIGNIFICANT CHANGE UP (ref 1–3.3)
MAGNESIUM SERPL-MCNC: 6.4 MG/DL — HIGH (ref 1.6–2.6)
MCHC RBC-ENTMCNC: 28.6 PG — SIGNIFICANT CHANGE UP (ref 27–34)
MCHC RBC-ENTMCNC: 32.1 GM/DL — SIGNIFICANT CHANGE UP (ref 32–36)
MCV RBC AUTO: 89.1 FL — SIGNIFICANT CHANGE UP (ref 80–100)
MONOCYTES # BLD AUTO: 0.87 K/UL — SIGNIFICANT CHANGE UP (ref 0–0.9)
MONOCYTES NFR BLD AUTO: 6 % — SIGNIFICANT CHANGE UP (ref 2–14)
MUCOUS THREADS # UR AUTO: PRESENT
NEUTROPHILS # BLD AUTO: 10.91 K/UL — HIGH (ref 1.8–7.4)
NEUTROPHILS NFR BLD AUTO: 75.6 % — SIGNIFICANT CHANGE UP (ref 43–77)
NITRITE UR-MCNC: NEGATIVE — SIGNIFICANT CHANGE UP
NRBC # BLD: 0 /100 WBCS — SIGNIFICANT CHANGE UP (ref 0–0)
NRBC # FLD: 0 K/UL — SIGNIFICANT CHANGE UP (ref 0–0)
PH UR: 7.5 — SIGNIFICANT CHANGE UP (ref 5–8)
PLATELET # BLD AUTO: 121 K/UL — LOW (ref 150–400)
POTASSIUM SERPL-MCNC: 4.2 MMOL/L — SIGNIFICANT CHANGE UP (ref 3.5–5.3)
POTASSIUM SERPL-SCNC: 4.2 MMOL/L — SIGNIFICANT CHANGE UP (ref 3.5–5.3)
PROT ?TM UR-MCNC: 197 MG/DL — SIGNIFICANT CHANGE UP
PROT SERPL-MCNC: 7 G/DL — SIGNIFICANT CHANGE UP (ref 6–8.3)
PROT UR-MCNC: 290 MG/DL
PROT UR-MCNC: 300 MG/DL
PROT/CREAT UR-RTO: 2.3 RATIO — HIGH (ref 0–0.2)
RBC # BLD: 4.48 M/UL — SIGNIFICANT CHANGE UP (ref 3.8–5.2)
RBC # FLD: 14 % — SIGNIFICANT CHANGE UP (ref 10.3–14.5)
RBC CASTS # UR COMP ASSIST: 4 /HPF — SIGNIFICANT CHANGE UP (ref 0–4)
REVIEW: SIGNIFICANT CHANGE UP
RH IG SCN BLD-IMP: POSITIVE — SIGNIFICANT CHANGE UP
SODIUM SERPL-SCNC: 138 MMOL/L — SIGNIFICANT CHANGE UP (ref 135–145)
SP GR SPEC: 1.01 — SIGNIFICANT CHANGE UP (ref 1–1.03)
SQUAMOUS # UR AUTO: 4 /HPF — SIGNIFICANT CHANGE UP (ref 0–5)
T PALLIDUM AB TITR SER: NEGATIVE — SIGNIFICANT CHANGE UP
URATE SERPL-MCNC: 5 MG/DL — SIGNIFICANT CHANGE UP (ref 2.5–7)
UROBILINOGEN FLD QL: 0.2 MG/DL — SIGNIFICANT CHANGE UP (ref 0.2–1)
WBC # BLD: 14.42 K/UL — HIGH (ref 3.8–10.5)
WBC # FLD AUTO: 14.42 K/UL — HIGH (ref 3.8–10.5)
WBC UR QL: 10 /HPF — HIGH (ref 0–5)

## 2024-09-19 PROCEDURE — 76815 OB US LIMITED FETUS(S): CPT | Mod: 26

## 2024-09-19 PROCEDURE — 59025 FETAL NON-STRESS TEST: CPT | Mod: 26

## 2024-09-19 RX ORDER — LABETALOL HYDROCHLORIDE 200 MG/1
20 TABLET, FILM COATED ORAL ONCE
Refills: 0 | Status: COMPLETED | OUTPATIENT
Start: 2024-09-19 | End: 2024-09-19

## 2024-09-19 RX ORDER — OXYTOCIN/RINGER'S LACTATE 20/500ML
167 PLASTIC BAG, INJECTION (ML) INTRAVENOUS
Qty: 30 | Refills: 0 | Status: DISCONTINUED | OUTPATIENT
Start: 2024-09-19 | End: 2024-09-20

## 2024-09-19 RX ORDER — MAGNESIUM SULFATE 500 MG/ML
4 VIAL (ML) INJECTION ONCE
Refills: 0 | Status: DISCONTINUED | OUTPATIENT
Start: 2024-09-19 | End: 2024-09-19

## 2024-09-19 RX ORDER — INFLUENZA VIRUS VACCINE 15; 15; 15; 15 UG/.5ML; UG/.5ML; UG/.5ML; UG/.5ML
0.5 SUSPENSION INTRAMUSCULAR ONCE
Refills: 0 | Status: DISCONTINUED | OUTPATIENT
Start: 2024-09-19 | End: 2024-09-23

## 2024-09-19 RX ORDER — SODIUM CHLORIDE IRRIG SOLUTION 0.9 %
1000 SOLUTION, IRRIGATION IRRIGATION
Refills: 0 | Status: DISCONTINUED | OUTPATIENT
Start: 2024-09-19 | End: 2024-09-20

## 2024-09-19 RX ORDER — CHLORHEXIDINE GLUCONATE ORAL RINSE 1.2 MG/ML
1 SOLUTION DENTAL DAILY
Refills: 0 | Status: DISCONTINUED | OUTPATIENT
Start: 2024-09-19 | End: 2024-09-20

## 2024-09-19 RX ORDER — OXYTOCIN/RINGER'S LACTATE 20/500ML
4 PLASTIC BAG, INJECTION (ML) INTRAVENOUS
Qty: 30 | Refills: 0 | Status: DISCONTINUED | OUTPATIENT
Start: 2024-09-19 | End: 2024-09-20

## 2024-09-19 RX ORDER — MAGNESIUM SULFATE 500 MG/ML
4 VIAL (ML) INJECTION ONCE
Refills: 0 | Status: COMPLETED | OUTPATIENT
Start: 2024-09-19 | End: 2024-09-19

## 2024-09-19 RX ORDER — AMPICILLIN TRIHYDRATE 125 MG/5ML
2 SUSPENSION, RECONSTITUTED, ORAL (ML) ORAL ONCE
Refills: 0 | Status: COMPLETED | OUTPATIENT
Start: 2024-09-19 | End: 2024-09-19

## 2024-09-19 RX ORDER — MAGNESIUM SULFATE 500 MG/ML
2 VIAL (ML) INJECTION
Qty: 40 | Refills: 0 | Status: DISCONTINUED | OUTPATIENT
Start: 2024-09-19 | End: 2024-09-20

## 2024-09-19 RX ORDER — OXYTOCIN/RINGER'S LACTATE 20/500ML
2 PLASTIC BAG, INJECTION (ML) INTRAVENOUS
Qty: 30 | Refills: 0 | Status: DISCONTINUED | OUTPATIENT
Start: 2024-09-19 | End: 2024-09-20

## 2024-09-19 RX ORDER — MAGNESIUM SULFATE 500 MG/ML
2 VIAL (ML) INJECTION
Qty: 40 | Refills: 0 | Status: DISCONTINUED | OUTPATIENT
Start: 2024-09-19 | End: 2024-09-19

## 2024-09-19 RX ORDER — SODIUM CHLORIDE IRRIG SOLUTION 0.9 %
1000 SOLUTION, IRRIGATION IRRIGATION ONCE
Refills: 0 | Status: DISCONTINUED | OUTPATIENT
Start: 2024-09-19 | End: 2024-09-20

## 2024-09-19 RX ORDER — AMPICILLIN TRIHYDRATE 125 MG/5ML
1 SUSPENSION, RECONSTITUTED, ORAL (ML) ORAL EVERY 4 HOURS
Refills: 0 | Status: DISCONTINUED | OUTPATIENT
Start: 2024-09-19 | End: 2024-09-20

## 2024-09-19 RX ORDER — SODIUM CHLORIDE IRRIG SOLUTION 0.9 %
1000 SOLUTION, IRRIGATION IRRIGATION
Refills: 0 | Status: DISCONTINUED | OUTPATIENT
Start: 2024-09-19 | End: 2024-09-19

## 2024-09-19 RX ORDER — DIPHENHYDRAMINE HCL 12.5MG/5ML
25 LIQUID (ML) ORAL ONCE
Refills: 0 | Status: COMPLETED | OUTPATIENT
Start: 2024-09-19 | End: 2024-09-19

## 2024-09-19 RX ORDER — LABETALOL HYDROCHLORIDE 200 MG/1
200 TABLET, FILM COATED ORAL EVERY 8 HOURS
Refills: 0 | Status: DISCONTINUED | OUTPATIENT
Start: 2024-09-19 | End: 2024-09-20

## 2024-09-19 RX ORDER — SODIUM CHLORIDE IRRIG SOLUTION 0.9 %
500 SOLUTION, IRRIGATION IRRIGATION ONCE
Refills: 0 | Status: COMPLETED | OUTPATIENT
Start: 2024-09-19 | End: 2024-09-19

## 2024-09-19 RX ORDER — PRENATAL VIT,CAL 76/IRON/FOLIC 29 MG-1 MG
1 TABLET ORAL
Refills: 0 | DISCHARGE

## 2024-09-19 RX ORDER — SODIUM CHLORIDE IRRIG SOLUTION 0.9 %
1000 SOLUTION, IRRIGATION IRRIGATION ONCE
Refills: 0 | Status: COMPLETED | OUTPATIENT
Start: 2024-09-19 | End: 2024-09-19

## 2024-09-19 RX ADMIN — Medication 50 GM/HR: at 14:15

## 2024-09-19 RX ADMIN — LABETALOL HYDROCHLORIDE 200 MILLIGRAM(S): 200 TABLET, FILM COATED ORAL at 14:22

## 2024-09-19 RX ADMIN — Medication 1000 MILLILITER(S): at 13:00

## 2024-09-19 RX ADMIN — Medication 108 GRAM(S): at 07:00

## 2024-09-19 RX ADMIN — Medication 4 MILLIUNIT(S)/MIN: at 23:50

## 2024-09-19 RX ADMIN — LABETALOL HYDROCHLORIDE 20 MILLIGRAM(S): 200 TABLET, FILM COATED ORAL at 13:49

## 2024-09-19 RX ADMIN — Medication 25 MILLIGRAM(S): at 18:43

## 2024-09-19 RX ADMIN — Medication 125 MILLILITER(S): at 07:00

## 2024-09-19 RX ADMIN — Medication 2 MILLIUNIT(S)/MIN: at 10:45

## 2024-09-19 RX ADMIN — Medication 50 MILLILITER(S): at 22:23

## 2024-09-19 RX ADMIN — Medication 108 GRAM(S): at 14:57

## 2024-09-19 RX ADMIN — LABETALOL HYDROCHLORIDE 200 MILLIGRAM(S): 200 TABLET, FILM COATED ORAL at 22:21

## 2024-09-19 RX ADMIN — Medication 300 GRAM(S): at 13:55

## 2024-09-19 RX ADMIN — Medication 50 GM/HR: at 19:19

## 2024-09-19 RX ADMIN — Medication 200 GRAM(S): at 03:12

## 2024-09-19 RX ADMIN — Medication 2 MILLIUNIT(S)/MIN: at 22:23

## 2024-09-19 RX ADMIN — Medication 500 MILLILITER(S): at 10:45

## 2024-09-19 RX ADMIN — Medication 108 GRAM(S): at 18:52

## 2024-09-19 RX ADMIN — Medication 108 GRAM(S): at 10:51

## 2024-09-19 RX ADMIN — CHLORHEXIDINE GLUCONATE ORAL RINSE 1 APPLICATION(S): 1.2 SOLUTION DENTAL at 06:33

## 2024-09-19 RX ADMIN — Medication 108 GRAM(S): at 23:14

## 2024-09-19 RX ADMIN — Medication 125 MILLILITER(S): at 03:12

## 2024-09-19 NOTE — OB PROVIDER LABOR PROGRESS NOTE - NSVAGINALEXAM_OBGYN_ALL_OB_DT
19-Sep-2024 08:47
19-Sep-2024 23:10
19-Sep-2024 17:11
19-Sep-2024 12:25
19-Sep-2024 13:55
19-Sep-2024 18:48
19-Sep-2024 10:11

## 2024-09-19 NOTE — OB PROVIDER LABOR PROGRESS NOTE - ASSESSMENT
@39.2wks sPEC/Mg  Cervical change noted  Pt placed on all fours  Pitocin restarted @4mu  Cont fetal/maternal monitoring  Will reassess in 1-2 hrs or sooner in clinically indicated    dw MD Chilo Lord NP

## 2024-09-19 NOTE — OB PROVIDER LABOR PROGRESS NOTE - ASSESSMENT
Discussed AROM. Consent obtained.   Fetal head noted to be well applied   AROM of clear fluid.  Pt making cervical change. Continue expectant management      DW: Dr. Chilo Nix, PGY-4

## 2024-09-19 NOTE — OB PROVIDER LABOR PROGRESS NOTE - ASSESSMENT
Plan   cont pitocin, currently 6mU, titrate PRN   patient placed on peanut ball to encourage descent   EFM cat 1    Harmeet LEE

## 2024-09-19 NOTE — OB PROVIDER LABOR PROGRESS NOTE - ASSESSMENT
IUPC in place, will titrate contractions until adequate.    Pitocin currently on 5mu   Epidural in place. Pt comfortable   + Response to scalp stim     Brina Nix, PGY-4  Limited forward cervical change (same examiner)   IUPC in place, will titrate contractions until adequate.    Pitocin currently on 5mu   Epidural in place. Pt comfortable   + Response to scalp stim     DW: Dr. Chilo Nix, PGY-4

## 2024-09-19 NOTE — OB PROVIDER H&P - CURRENT PREGNANCY COMPLICATIONS, OB PROFILE
Maternal Positive GBS Preeclampsia/Abnormal Amniotic Fluid Volume/Maternal Positive GBS Preeclampsia/Maternal Positive GBS

## 2024-09-19 NOTE — OB PROVIDER H&P - ASSESSMENT
A: 23 y/o  @ 39w2d in early labor now meeting criteria for preeclampsia without severe features. GBS positive.    P: Admit to L&D      Expectant managment     BP monitoring     Repeat HTN labs sent     Ampicillin for GBS prophylaxis     Clear liquid diet     EFM/TOCO     IV access     Admit labs & consents     4 epidural    Risks, benefits, alternatives, and possible complications have been discussed in detail by Dr Goldberg with the patient in her native language, Pre-admission, admission, post admission procedures and expectations were discussed in detail. All questions answered, all appropriate hospital consents were signed. Anticipate normal vaginal delivery.  Informed consent was obtained. The following was discussed:  - Induction/augmentation of labor: use of medication or cook balloon to begin or enhance labor  - Obstetrical management including maternal-fetal contraction monitoring    Case discussed with Dr Goldberg Erin Hutchens PA-C

## 2024-09-19 NOTE — OB PROVIDER LABOR PROGRESS NOTE - NS_SUBJECTIVE/OBJECTIVE_OBGYN_ALL_OB_FT
Comfortable w/ epi.    Vital Signs Last 24 Hrs  T(C): 36.9 (19 Sep 2024 05:37), Max: 37.1 (19 Sep 2024 00:47)  T(F): 98.4 (19 Sep 2024 05:37), Max: 98.8 (19 Sep 2024 00:47)  HR: 121 (19 Sep 2024 05:46) (79 - 121)  BP: 149/83 (19 Sep 2024 05:45) (121/70 - 163/101)  BP(mean): --  RR: 17 (19 Sep 2024 05:37) (16 - 17)  SpO2: 100% (19 Sep 2024 05:46) (98% - 100%)    VE 4/100/-3 bulging bag
Patient examined, feeling pain/pressure in her back
Patient examined, feeling worsening back pain.
Patient examined, making very minimal cervical change. Unable to achieve strong contractions despite pitocin at 25mU
Attending Labor Note     Patient examined. EFM Cat 2 due to minimal variability in setting of magnesium. Moderate variability able to be achieved with scalp stim.    Cervix with minimal change since prior exam, but contractions very inadequate since insertion of IUPC. Some cervical edema noted.
Patient seen and examined to assess fetal status by performing fetal scalp stimulation. Effective epidural in situ.  VS  T(C): 36.9 (09-19-24 @ 23:29)  HR: 101 (09-19-24 @ 23:31)  BP: 107/64 (09-19-24 @ 23:24)  RR: 17 (09-19-24 @ 23:29)  SpO2: 99% (09-19-24 @ 23:31)
Pt seen after patient had sustained severe range blood pressures.   Pt feels well. Denies symptoms of HA, visual changes, or RUQ pain.   Reports feeling rectal pressure and back pain
Pt seen for AROM  Feeling intermittent pressure.   Epidural in place, working well
Pt seen for assessment of labor curve.   EFM with minimal variability

## 2024-09-19 NOTE — OB PROVIDER H&P - NSHPPHYSICALEXAM_GEN_ALL_CORE
ICU Vital Signs Last 24 Hrs  T(C): 37.1 (19 Sep 2024 01:38), Max: 37.1 (19 Sep 2024 00:47)  T(F): 98.78 (19 Sep 2024 01:38), Max: 98.8 (19 Sep 2024 00:47)  HR: 80 (19 Sep 2024 02:55) (79 - 95)  BP: 163/101 (19 Sep 2024 02:55) (139/102 - 163/101)  RR: 16 (19 Sep 2024 00:47) (16 - 16)    General: Patient sitting uncomfortably in bed, A&Ox3  Abd: soft, non-tender, gravid, TOCO in place  Bedside Transabdominal Sonogram: Vertex presentation, anterior placenta, +FH noted, M mode 133 BPM,  images saved in ASOB  EFM: BPM baseline, moderate variability, +accels, - decels, category I tracing  [place]: Irregular contractions  SSE:  SVE: _/_/  Ext:  FROM bilateral no edema/+1 edema  Neuro: grossly intact ICU Vital Signs Last 24 Hrs  T(C): 37.1 (19 Sep 2024 01:38), Max: 37.1 (19 Sep 2024 00:47)  T(F): 98.78 (19 Sep 2024 01:38), Max: 98.8 (19 Sep 2024 00:47)  HR: 80 (19 Sep 2024 02:55) (79 - 95)  BP: 163/101 (19 Sep 2024 02:55) (139/102 - 163/101)  RR: 16 (19 Sep 2024 00:47) (16 - 16)    General: Patient sitting uncomfortably in bed, A&Ox3  Abd: soft, non-tender, gravid, TOCO in place  Bedside Transabdominal Sonogram: Vertex presentation, anterior placenta, +FH noted, M mode 133 BPM,  images saved in ASOB  EFM: 140 BPM baseline, moderate variability, +accels, - decels, category I tracing, reactive  [place]: Regular contractions q3-5  SVE: 3.5/80/-3  Ext:  FROM bilateral no edema  Neuro: grossly intact

## 2024-09-19 NOTE — OB PROVIDER H&P - NS_TOLAC_OBGYN_ALL_OB_NU
Referring Physician:  Shyann Giordano, [place] [facility] [address]  [facility],  [address] [facility]  Isaías Rutledge is a 2 y.o. female that has returned to the pediatric urology clinic in follow up for urinary incontinence. Sicne the last visit family did not complete renal ultrasound and stopped miralax after 1 week. The condition was first noted to be present since toilet training around age 2. This has not been associated with UTI's. Modifying factors include voiding every hour which has not been effective in decreasing the accidents. According to family, Darrion Crenshaw does void first thing in the morning. Elidia typically voids every 1-3 hours throughout the day. She has urinary urgency with holding maneuvers half of the time. Urinary incontinence throughout the day is an issue. Elidia has damp to wet accidents in pull ups 3-4 times per day. It does not bother Elidia to sit in her pull up or underwear are wet. At times when they are in the car or not near a bathroom family will instruct Elidia to \"just go\" in her pull up when she asks to go to a bathroom. Family reports that Michael Mayer tell\" when she is wet. Elidia attends school all day and remains dry. Per family she will void with the class without accidents. Nighttime accidents do occur every night. The family reports a bowel movement every day. Stools are described as alternating balls and loose stools without abdominal pain. Elidia has not had any recent stool accidents previously reported 1-2 days per weeks. Elidia has a history of constipation and was previously treated with Miralax however family stopped the medication due to loose stools. This was the same reason provided today for why the miralax was stopped after last visit.      Pain Scale 0    ROS:  Constitutional: no weight loss, fever, night sweats  Eyes: negative  Ears/Nose/Throat/Mouth: negative  Respiratory: negative  Cardiovascular: negative  Gastrointestinal: negative  Skin: negative  Musculoskeletal: negative  Neurological: negative  Endocrine:  negative  Hematologic/Lymphatic: negative  Psychologic: negative    Allergies: No Known Allergies    Medications:   Current Outpatient Medications:     polyethylene glycol (GLYCOLAX) 17 GM/SCOOP powder, Add 1 capful to 1 cup of water and give twice daily for 3 days then once daily. (Patient not taking: Reported on 3/25/2021), Disp: 850 g, Rfl: 3    hydrOXYzine (ATARAX) 10 MG/5ML syrup, Take 5 mLs by mouth 4 times daily as needed for Itching (Patient not taking: Reported on 2/18/2021), Disp: 180 mL, Rfl: 0    Emollient (CERAVE) CREA, Apply 2 times per day and as needed (Patient not taking: Reported on 3/25/2021), Disp: 453 g, Rfl: 3    guaiFENesin (ALTARUSSIN) 100 MG/5ML syrup, Take 5 mLs by mouth every 4 hours as needed for Cough or Congestion (Patient not taking: Reported on 2/8/2021), Disp: 450 mL, Rfl: 1    ibuprofen (CHILDRENS ADVIL) 100 MG/5ML suspension, Take 10.6 mLs by mouth every 6 hours as needed for Pain or Fever, Disp: 1 Bottle, Rfl: 1    acetaminophen (TYLENOL CHILDRENS) 160 MG/5ML suspension, Take 9.94 mLs by mouth every 6 hours as needed for Fever, Disp: 397.6 mL, Rfl: 0    albuterol sulfate  (90 Base) MCG/ACT inhaler, Inhale 2 puffs into the lungs every 6 hours as needed for Wheezing (Patient not taking: Reported on 2/18/2021), Disp: 1 Inhaler, Rfl: 0    Spacer/Aero-Holding Chambers ESTRELLA, Use daily with inhaler(s) (Patient not taking: Reported on 2/18/2021), Disp: 1 Device, Rfl: 0    sodium chloride (BABY AYR SALINE) 0.65 % nasal spray, Instill 1 spray in each nostril 4 times per day as needed.  (Patient not taking: Reported on 2/18/2021), Disp: 1 Bottle, Rfl: 2    Emollient (DERMAPHOR) OINT ointment, APPLY TO THE AFFECTED AREA FOUR TIMES A DAY AS NEEDED (Patient not taking: Reported on 3/25/2021), Disp: 454 g, Rfl: 5    Vaporizer MISC, Use nightly for asthma, nasal congestion (Patient not taking: Reported on 6/30/2020), Disp: 1 each, Rfl: 0    hydrocortisone 2.5 % ointment, Apply topically twice daily as needed for eczema. (Patient not taking: Reported on 3/25/2021), Disp: 60 g, Rfl: 3    budesonide (PULMICORT) 0.5 MG/2ML nebulizer suspension, Take 2 mLs by nebulization 2 times daily (Patient not taking: Reported on 3/25/2021), Disp: 60 ampule, Rfl: 3    albuterol (PROVENTIL) (2.5 MG/3ML) 0.083% nebulizer solution, Take 3 mLs by nebulization every 6 hours as needed for Wheezing or Shortness of Breath (Patient not taking: Reported on 2/18/2021), Disp: 75 each, Rfl: 0    Skin Protectants, Misc. (CERAVE) OINT, , Disp: , Rfl:     Respiratory Therapy Supplies (NEBULIZER/TUBING/MOUTHPIECE) KIT, 1 kit by Does not apply route daily as needed (cough) (Patient not taking: Reported on 2/18/2021), Disp: 1 kit, Rfl: 0    Past Medical History:   Past Medical History:   Diagnosis Date    Asthma     Febrile seizure ([address] 10/12/2018    Infantile atopic dermatitis 3/20/2017     Family History:   Family History   Problem Relation Age of Onset    High Blood Pressure Mother     Substance Abuse Mother     Other Maternal Aunt         epeilepsy    High Blood Pressure Maternal Grandmother     Asthma Maternal Grandmother     Diabetes Maternal Grandmother     High Blood Pressure Maternal Grandfather     Heart Disease Maternal Grandfather     Other Paternal Grandfather         alzhelmers     Surgical History:   Past Surgical History:   Procedure Laterality Date    HERNIA REPAIR  85/43/4608    umbilical hernia repair    UMBILICAL HERNIA REPAIR N/A 1/32/4071    UMBILICAL HERNIA REPAIR performed by Waleska Tovar MD at [address] [place] [address] History: Lives at home with family.       Immunizations: stated as up to date, no records available    PHYSICAL EXAM  Vitals: Temp 97.9 °F (36.6 °C)   Ht 45.28\" (115 cm)   Wt 48 lb 4 oz (21.9 kg)   BMI 16.55 kg/m²   General appearance:  well developed and well nourished  Skin:  normal coloration and turgor, no rashes  HEENT:  trachea midline, head is normocephalic, atraumatic  Neck:  supple, full range of motion, no mass, normal lymphadenopathy, no thyromegaly  Heart:  not examined  Lungs: Respiratory effort normal  Abdomen: Normal bowel sounds, soft, nondistended, no mass, no organomegaly. Palpable stool: yes  Bladder: no bladder distension noted  Kidney: no tenderness in spine or flanks  Genitalia: not examined  Back:  masses absent  Extremities:  normal and symmetric movement, normal range of motion, no joint swelling    Urinalysis  Results for POC orders placed in visit on 03/25/21   POCT Urine with Microscopic   Result Value Ref Range    Color, UA Yellow     Clarity, UA Clear Clear    Glucose, Ur Negative     Bilirubin Urine      Ketones, Urine      Specific Gravity, UA 1.030 1.005 - 1.030    Blood, Urine Negative     pH, UA 5.0 4.5 - 8.0    Protein, UA Positive (A) Negative    Nitrite, Urine Negative     Leukocytes, UA Positive     Urobilinogen, Urine      rbc urine, poc neg     wbc urine, poc 5-10phf     bacteria urine, poc neg     yeast urine, poc      casts urine, poc      epi cells urine, poc rare     crystals urine, poc       Imaging  No new Radiology. Bladder Scan: not completed    LABS see previous records     RECORDS REVIEW  2/8/21 UC no growth   1/5/21 CMP BUN 13 Creat 0.25    1/5/21 UA moderate leuks otherwise negative  5/26/20 UC no growth     IMPRESSION   1. Urinary incontinence, unspecified type    2. Urinary tract infection without hematuria, site unspecified    3. Constipation, unspecified constipation type      PLAN  1. Family is to complete renal ultrasound as previously discussed due to possible history of UTI. Another order was provided for family   2. Elidia is to void every 2 hours through out the day even if the urge is not felt. I have asked family to help prompt the child to prevent holding behaviors.  I strongly recommended that Elidia stop wearing pull ups as often as possible. Elidia is to never intentionally void in her pull up. I spoke with Elidia regarding the importance going to the bathroom when asked by family and trying to wear underwear whenever she can. 3. We will plan to complete a 3 day ex lax clean out along with daily miralax. I provided a handout on how to complete the clean out and what to expect with this bowel regimen. Family is to call the office if the clean out does not produce a large amount of stool. I reviewed the above plan with the family based on the history provided and physical exam. I have asked family to call the office with any additional concerns or symptoms consistent with a UTI. Cisco Ortiz will return to clinic in 2 months. 0

## 2024-09-19 NOTE — OB PROVIDER H&P - NS ATTEND AMEND GEN_ALL_CORE FT
Agree w/ above.    23yo  at 39w2d here in labor. Rules in for PEC w/o severe features  - BP monitoring  - HELLP labs: wnl  - P:C 2.3  - GBS pos, amp started  - Epi requested  - Expectant management  - Cat 1  - PPH risk: medium due to polyhydramnios (by MVP not ALICIA)    J Goldberg MD Agree w/ above.    25yo  at 39w2d here in labor. Rules in for PEC w/o severe features  - BP monitoring  - HELLP labs wnl except platelets 121  - P:C 2.3  - GBS pos, amp started  - Epi requested  - Expectant management  - Cat 1    J Goldberg MD

## 2024-09-19 NOTE — OB RN PATIENT PROFILE - NSSDOHHEADEN_OBGYN_A_OB
Writer Left message for Ramila/ [facility] and relays message below from Brock Flores. Asked Ruby to call back to verify that she has received the instructions and if she has any additional questions.   no

## 2024-09-19 NOTE — OB RN TRIAGE NOTE - FALL HARM RISK - UNIVERSAL INTERVENTIONS
Bed in lowest position, wheels locked, appropriate side rails in place/Call bell, personal items and telephone in reach/Instruct patient to call for assistance before getting out of bed or chair/Non-slip footwear when patient is out of bed/Elfrida to call system/Physically safe environment - no spills, clutter or unnecessary equipment/Purposeful Proactive Rounding/Room/bathroom lighting operational, light cord in reach

## 2024-09-19 NOTE — OB PROVIDER H&P - NS_OBGYNHISTORY_OBGYN_ALL_OB_FT
OB History: Primigravida    GYN History:  - H/o PCOS, no meds  - H/o bartholin cyst  - Denies history of endometriosis/uterine fibroids/abnormal pap smears/STIs

## 2024-09-19 NOTE — OB PROVIDER LABOR PROGRESS NOTE - ASSESSMENT
Plan   anesthesia called for epidural top off   EFM Cat 1  will start pitocin for augmentation     Harmeet LEE

## 2024-09-19 NOTE — OB PROVIDER LABOR PROGRESS NOTE - ASSESSMENT
Plan   cont pitocin, titrate to adequate contractions   EFM cat 2 intermittently due to Mg but fetus has appropriate response to scalp stim, cont monitoring   BPs well controlled at this time   Repositioning to encourage descent and periodically resuscitate tracing.     Harmeet LEE

## 2024-09-19 NOTE — OB PROVIDER H&P - NSLOWPPHRISK_OBGYN_A_OB
No previous uterine incision/Kendrick Pregnancy/Less than or equal to 4 previous vaginal births/No known bleeding disorder/No history of postpartum hemorrhage/No other PPH risks indicated

## 2024-09-19 NOTE — OB RN PATIENT PROFILE - NSWEIGHTCALCTOOLDRUG_GEN_A_CORE
PROCEDURE:  Lumbar Transforaminal Epidural Steroid Injection under Fluoroscopic Guidance    LEVELS TREATED: L5/S1    SIDE: Bilateral    PREOPERATIVE DIAGNOSES:  Lumbosacral Radiculopathy, Discogenic Pain    POSTOPERATIVE DIAGNOSES:  Same    LOCATION: [facility]    SURGEON: Elias Aguero DO    ASSISTANTS: None    ANESTHESIA:  Local Only    DESCRIPTION OF OPERATIVE PROCEDURE: The patient was examined, and we discussed the risk, benefits and alternatives of the procedure. An informed consent was obtained. An IV was not placed. The patient was brought to the procedure room and positioned prone on the table for the procedure. Time-out was conducted with all members of the care team present. Standard ASA monitors were placed. Standard prep (chloraprep) and drape were performed. Sterile technique was used throughout.    With the patient prone, the appropriate level of the lumbar spine (L5 vertebral body) was identified above the sacrum in an A/P fluoroscopic view. Next, the endplates were squared off using a slight caudal tilt. The fluoroscope was then obliqued ipsilaterally to the intended foramen (left 15°). At the site just inferior to the superior pedicle, a 25-gauge, 1.5\" hypodermic needle was used to anesthetize the skin and superficial tissue with 4 mL of 1% lidocaine on the left. Next, a 22-gauge, 7\" curved spinal needle was guided into the epidural space using a transforaminal approach under ipsilateral oblique, A/P and lateral fluoroscopic views. Once in the desired location, A/P and lateral fluoroscopic views were utilized to verify the needle position in the epidural space in the lateral view, but not past the 6-o'clock position on the pedicle A/P. The stylet was removed from the needle and after negative aspiration for blood/CSF, 0.25 ml of Isovue-200 contrast was injected under intermittent fluoroscopy. Good contrast spread was noted in both the A/P and lateral projections along the nerve root  and medial to the pedicle in the epidural space, without evidence of intraneural, intrathecal, or intravascular injection. After a negative aspiration, a swirled mixture of 0.5 ml (5 mg) dexamethasone, 0.5 ml of 1% lidocaine, and 0.5 ml of sterile, preservative free normal saline was injected into the epidural space (total volume 1.5 ml). The needle was then removed while aspirating and gauze was used to apply pressure to the injection site. The procedure was repeated on the right side in the same manner as above.     Following the procedure, the surgical site preparation was washed off, a Band-Aid was applied, and the patient was brought to the recovery area. The patient did very well, and the procedure results were discussed. Standard discharge instructions were given to the patient. The patient knows how to contact the clinic should they have any questions or concerns. Our clinic staff will call the patient for follow-up tomorrow.    Preprocedural pain score was 5/10  Post procedural pain score was 0/10    COMPLICATIONS: None    EBL: Minimal    FLUIDS: None    PLAN: Follow up in clinic in 3-4 weeks.    Elias Aguero, DO  Interventional Pain Management     used

## 2024-09-19 NOTE — OB PROVIDER LABOR PROGRESS NOTE - ASSESSMENT
- Progressing in latent labor  - Consider AROM vs pit if stops making cervical change  - Amp for GBS  - Cat 1  - Epi in place  - Asx for PEC. CTM BPs J Goldberg MD

## 2024-09-19 NOTE — OB PROVIDER H&P - PROBLEM SELECTOR PLAN 1
P: Admit to L&D      Expectant management     BP monitoring     Repeat HTN labs sent     Ampicillin for GBS prophylaxis     Clear liquid diet     EFM/TOCO     IV access     Admit labs & consents     4 epidural

## 2024-09-19 NOTE — OB PROVIDER LABOR PROGRESS NOTE - ASSESSMENT
Continue Pitocin currently at 3mu   IVP of Lab 20  was initiated and Magnesium was started.  Diagnosis and criteria of SPEC was explained. The need for blood pressure control and Magnesium was explained.   The increased risk for heart disease was detailed and the patient was made aware that she will need to follow with a cardiologist as an outpatient.   Pt verbalized understanding and all questions were answered     DW: Dr. Chilo Nix, PGY-4

## 2024-09-19 NOTE — OB PROVIDER LABOR PROGRESS NOTE - ASSESSMENT
Plan   Discussed protracted labor course with patient. At this time she has no cervical change on pitocin with inadequate contractions over 5.5hrs and had minimal cervical change over the preceding 4hrs. Increasing dosing of pitocin not achieving stronger contractions. Discussed with patient that she will soon meet criteria for arrest of labor. Explained that at this time we could proceed with primary C/S. Offered to begin preparations at this time. Patient is frustrated and uncomfortable with the prolonged labor, but declines C/S at this time. Patient would like to continue for 2 more hours and then reassess.   Will pause pitocin x1h then restart at 4mU.  Fetal status remains overall reassuring, periods of minimal variability with pitocin but intermittently moderate and responds to scalp stim    Will re-eval in 2hrs or sooner VIDA Ga MD

## 2024-09-19 NOTE — OB PROVIDER LABOR PROGRESS NOTE - NS_OBIHIFHRDETAILS_OBGYN_ALL_OB_FT
125 mod mendoza/+accels/-deccels     +fetal scap stim noted with exam
Baseline 110 Minimal variability. +Accels - Decels   + Response to scalp stim - now with moderate variability
150, mod mendoza, +accels, -decels
125, min mendoza in setting of mg, moderate variability with scalp stim
140/mod/accels/no decels, cat 1
135, mod, +accels, -decels
Baseline 150 Moderate variability. +Accels - Decels
125, moderate variability with scalp stim but period of minimal variability
Baseline 135 Moderate variability. +Accels - Decels

## 2024-09-19 NOTE — OB PROVIDER H&P - HISTORY OF PRESENT ILLNESS
25 y/o  at 39w2d GA, [facility], presenting with c/o painful ctx since 24 at 1600.  Patient reports ctx q3-5 mins, rates pain to be 8 out of 10 in severity.  Patient desires epidural for pain control.  Endorses good fetal movement. Denies: vaginal bleeding, leakage of fluid, abdominal pain, contractions.    Primary OB care with [facility]  Patient had P/C ratio drawn on  due to lower extremity swelling, resulted to be 0.4, instructed to monitor BPs at home  BP at last prenatal visit on 24: 150/92  MFM scan from 24: EFW 3430g (64%), AC >90%, ALICIA 20.9cm  EFW extrapolated from MFM sono: 3600g  GBS: Positive (24)   23 y/o  at 39w2d GA, [facility], presenting with c/o painful ctx since 24 at 1600.  Patient reports ctx q3-5 mins, rates pain to be 8 out of 10 in severity.  Patient desires epidural for pain control.  Endorses good fetal movement. Denies: vaginal bleeding, leakage of fluid, abdominal pain, contractions.    Primary OB care with [facility]  Patient had P/C ratio drawn on  due to lower extremity swelling, resulted to be 0.4, instructed to monitor BPs at home  BP at last prenatal visit on 24: 150/92  MFM scan from 24: EFW 3430g (64%), AC >90%, ALICIA 20.9cm but MVP >8cm  EFW extrapolated from MFM sono: 3600g  GBS: Positive (24)

## 2024-09-20 LAB
MAGNESIUM SERPL-MCNC: 6 MG/DL — HIGH (ref 1.6–2.6)
MAGNESIUM SERPL-MCNC: 6.5 MG/DL — HIGH (ref 1.6–2.6)
MAGNESIUM SERPL-MCNC: 7.5 MG/DL — CRITICAL HIGH (ref 1.6–2.6)

## 2024-09-20 PROCEDURE — 59510 CESAREAN DELIVERY: CPT

## 2024-09-20 RX ORDER — OXYCODONE HYDROCHLORIDE 30 MG/1
5 TABLET, FILM COATED, EXTENDED RELEASE ORAL
Refills: 0 | Status: DISCONTINUED | OUTPATIENT
Start: 2024-09-20 | End: 2024-09-21

## 2024-09-20 RX ORDER — ACETAMINOPHEN 325 MG
975 TABLET ORAL
Refills: 0 | Status: DISCONTINUED | OUTPATIENT
Start: 2024-09-20 | End: 2024-09-23

## 2024-09-20 RX ORDER — LABETALOL HYDROCHLORIDE 200 MG/1
200 TABLET, FILM COATED ORAL EVERY 8 HOURS
Refills: 0 | Status: DISCONTINUED | OUTPATIENT
Start: 2024-09-20 | End: 2024-09-23

## 2024-09-20 RX ORDER — FERROUS SULFATE 325(65) MG
325 TABLET ORAL DAILY
Refills: 0 | Status: DISCONTINUED | OUTPATIENT
Start: 2024-09-20 | End: 2024-09-23

## 2024-09-20 RX ORDER — MORPHINE SULFATE 30 MG/1
2 TABLET, FILM COATED, EXTENDED RELEASE ORAL ONCE
Refills: 0 | Status: DISCONTINUED | OUTPATIENT
Start: 2024-09-20 | End: 2024-09-21

## 2024-09-20 RX ORDER — SODIUM CHLORIDE IRRIG SOLUTION 0.9 %
1000 SOLUTION, IRRIGATION IRRIGATION
Refills: 0 | Status: DISCONTINUED | OUTPATIENT
Start: 2024-09-20 | End: 2024-09-21

## 2024-09-20 RX ORDER — OXYCODONE HYDROCHLORIDE 30 MG/1
5 TABLET, FILM COATED, EXTENDED RELEASE ORAL
Refills: 0 | Status: DISCONTINUED | OUTPATIENT
Start: 2024-09-20 | End: 2024-09-23

## 2024-09-20 RX ORDER — TETANUS TOXOID, REDUCED DIPHTHERIA TOXOID AND ACELLULAR PERTUSSIS VACCINE, ADSORBED 5; 2.5; 8; 8; 2.5 [IU]/.5ML; [IU]/.5ML; UG/.5ML; UG/.5ML; UG/.5ML
0.5 SUSPENSION INTRAMUSCULAR ONCE
Refills: 0 | Status: DISCONTINUED | OUTPATIENT
Start: 2024-09-20 | End: 2024-09-23

## 2024-09-20 RX ORDER — NALBUPHINE HYDROCHLORIDE 10 MG/ML
2.5 INJECTION, SOLUTION INTRAMUSCULAR; INTRAVENOUS; SUBCUTANEOUS EVERY 6 HOURS
Refills: 0 | Status: DISCONTINUED | OUTPATIENT
Start: 2024-09-20 | End: 2024-09-21

## 2024-09-20 RX ORDER — SODIUM CHLORIDE IRRIG SOLUTION 0.9 %
500 SOLUTION, IRRIGATION IRRIGATION ONCE
Refills: 0 | Status: DISCONTINUED | OUTPATIENT
Start: 2024-09-20 | End: 2024-09-23

## 2024-09-20 RX ORDER — OXYCODONE HYDROCHLORIDE 30 MG/1
5 TABLET, FILM COATED, EXTENDED RELEASE ORAL ONCE
Refills: 0 | Status: DISCONTINUED | OUTPATIENT
Start: 2024-09-20 | End: 2024-09-23

## 2024-09-20 RX ORDER — PRENATAL VIT,CAL 76/IRON/FOLIC 29 MG-1 MG
1 TABLET ORAL DAILY
Refills: 0 | Status: DISCONTINUED | OUTPATIENT
Start: 2024-09-20 | End: 2024-09-23

## 2024-09-20 RX ORDER — DIPHENHYDRAMINE HCL 12.5MG/5ML
25 LIQUID (ML) ORAL EVERY 6 HOURS
Refills: 0 | Status: DISCONTINUED | OUTPATIENT
Start: 2024-09-20 | End: 2024-09-23

## 2024-09-20 RX ORDER — SODIUM CHLORIDE IRRIG SOLUTION 0.9 %
1000 SOLUTION, IRRIGATION IRRIGATION
Refills: 0 | Status: DISCONTINUED | OUTPATIENT
Start: 2024-09-20 | End: 2024-09-20

## 2024-09-20 RX ORDER — ACETAMINOPHEN 325 MG
1000 TABLET ORAL ONCE
Refills: 0 | Status: COMPLETED | OUTPATIENT
Start: 2024-09-20 | End: 2024-09-20

## 2024-09-20 RX ORDER — SENNOSIDES 8.6 MG
2 TABLET ORAL AT BEDTIME
Refills: 0 | Status: DISCONTINUED | OUTPATIENT
Start: 2024-09-20 | End: 2024-09-23

## 2024-09-20 RX ORDER — MAGNESIUM HYDROXIDE 400 MG/5ML
30 SUSPENSION, ORAL (FINAL DOSE FORM) ORAL
Refills: 0 | Status: DISCONTINUED | OUTPATIENT
Start: 2024-09-20 | End: 2024-09-23

## 2024-09-20 RX ORDER — MAGNESIUM SULFATE 500 MG/ML
2 VIAL (ML) INJECTION
Qty: 40 | Refills: 0 | Status: DISCONTINUED | OUTPATIENT
Start: 2024-09-20 | End: 2024-09-21

## 2024-09-20 RX ORDER — NALOXONE HYDROCHLORIDE 0.4 MG/ML
0.1 INJECTION, SOLUTION INTRAMUSCULAR; INTRAVENOUS; SUBCUTANEOUS
Refills: 0 | Status: DISCONTINUED | OUTPATIENT
Start: 2024-09-20 | End: 2024-09-21

## 2024-09-20 RX ORDER — KETOROLAC TROMETHAMINE 10 MG/1
30 TABLET, FILM COATED ORAL EVERY 6 HOURS
Refills: 0 | Status: DISCONTINUED | OUTPATIENT
Start: 2024-09-20 | End: 2024-09-21

## 2024-09-20 RX ORDER — ONDANSETRON HCL/PF 4 MG/2 ML
4 VIAL (ML) INJECTION EVERY 6 HOURS
Refills: 0 | Status: DISCONTINUED | OUTPATIENT
Start: 2024-09-20 | End: 2024-09-21

## 2024-09-20 RX ORDER — OXYTOCIN/RINGER'S LACTATE 20/500ML
167 PLASTIC BAG, INJECTION (ML) INTRAVENOUS
Qty: 30 | Refills: 0 | Status: DISCONTINUED | OUTPATIENT
Start: 2024-09-20 | End: 2024-09-20

## 2024-09-20 RX ORDER — OXYCODONE HYDROCHLORIDE 30 MG/1
10 TABLET, FILM COATED, EXTENDED RELEASE ORAL
Refills: 0 | Status: DISCONTINUED | OUTPATIENT
Start: 2024-09-20 | End: 2024-09-20

## 2024-09-20 RX ADMIN — Medication 50 GM/HR: at 06:44

## 2024-09-20 RX ADMIN — LABETALOL HYDROCHLORIDE 200 MILLIGRAM(S): 200 TABLET, FILM COATED ORAL at 05:32

## 2024-09-20 RX ADMIN — Medication 400 MILLIGRAM(S): at 05:32

## 2024-09-20 RX ADMIN — Medication 50 GM/HR: at 10:48

## 2024-09-20 RX ADMIN — Medication 5000 UNIT(S): at 22:06

## 2024-09-20 RX ADMIN — Medication 975 MILLIGRAM(S): at 22:07

## 2024-09-20 RX ADMIN — Medication 975 MILLIGRAM(S): at 16:19

## 2024-09-20 RX ADMIN — Medication 50 GM/HR: at 07:08

## 2024-09-20 RX ADMIN — Medication 1000 MILLIGRAM(S): at 06:02

## 2024-09-20 RX ADMIN — KETOROLAC TROMETHAMINE 30 MILLIGRAM(S): 10 TABLET, FILM COATED ORAL at 11:53

## 2024-09-20 RX ADMIN — KETOROLAC TROMETHAMINE 30 MILLIGRAM(S): 10 TABLET, FILM COATED ORAL at 12:23

## 2024-09-20 RX ADMIN — LABETALOL HYDROCHLORIDE 200 MILLIGRAM(S): 200 TABLET, FILM COATED ORAL at 22:06

## 2024-09-20 RX ADMIN — Medication 975 MILLIGRAM(S): at 22:30

## 2024-09-20 RX ADMIN — Medication 5000 UNIT(S): at 09:44

## 2024-09-20 RX ADMIN — KETOROLAC TROMETHAMINE 30 MILLIGRAM(S): 10 TABLET, FILM COATED ORAL at 18:16

## 2024-09-20 RX ADMIN — Medication 50 GM/HR: at 19:03

## 2024-09-20 RX ADMIN — Medication 975 MILLIGRAM(S): at 16:59

## 2024-09-20 NOTE — OB RN DELIVERY SUMMARY - NS_SEPSISRSKCALC_OBGYN_ALL_OB_FT
EOS calculated successfully. EOS Risk Factor: 0.5/1000 live births ([facility] national incidence); GA=39w3d; Temp=99.14; ROM=17.2; GBS='Positive'; Antibiotics='GBS specific antibiotics > 2 hrs prior to birth'

## 2024-09-20 NOTE — DISCHARGE NOTE OB - PATIENT PORTAL LINK FT
You can access the FollowMyHealth Patient Portal offered by [facility] by registering at the following website: [email or web address]. By joining [facility]’s FollowMyHealth portal, you will also be able to view your health information using other applications (apps) compatible with our system.

## 2024-09-20 NOTE — DISCHARGE NOTE OB - CARE PROVIDER_API CALL
Sample-Meredith Hernandez  Obstetrics and Gynecology  [address], [address]  [place] [address]  Phone: [phone]  Fax: [phone]  Follow Up Time: 2 weeks

## 2024-09-20 NOTE — OB PROVIDER DELIVERY SUMMARY - NSSELHIDDEN_OBGYN_ALL_OB_FT
[NS_DeliveryAttending1_OBGYN_ALL_OB_FT:[id number]=],[NS_DeliveryAssist1_OBGYN_ALL_OB_FT:[id number]==]

## 2024-09-20 NOTE — DISCHARGE NOTE OB - MEDICATION SUMMARY - MEDICATIONS TO TAKE
I will START or STAY ON the medications listed below when I get home from the hospital:    ibuprofen 600 mg oral tablet  -- 1 tab(s) by mouth every 6 hours as needed for  moderate pain  -- Indication: For Pain    acetaminophen 325 mg oral tablet  -- 3 tab(s) by mouth every 6 hours as needed for  mild pain  -- Indication: For Pain    labetalol 200 mg oral tablet  -- 1 tab(s) by mouth every 8 hours  -- Indication: For Severe pre-eclampsia    PNV Prenatal oral tablet  -- 1 tab(s) by mouth once a day  -- Indication: For Vitamin    ferrous sulfate 325 mg (65 mg elemental iron) oral tablet  -- 1 tab(s) by mouth once a day  -- Indication: For Anemia

## 2024-09-20 NOTE — DISCHARGE NOTE OB - MATERIALS PROVIDED
[facility] [place] Screening Program/[place]  Immunization Record/Breastfeeding Log/Bottle Feeding Log/Breastfeeding Mother’s Support Group Information/Guide to Postpartum Care/Back To Sleep Handout/Shaken Baby Prevention Handout/Breastfeeding Guide and Packet/Birth Certificate Instructions/Discharge Medication Information for Patients and Families Pocket Guide/Prescription for Breast Pump/MMR Vaccination (VIS Pub Date: 2012)/Tdap Vaccination (VIS Pub Date: 2012)

## 2024-09-20 NOTE — DISCHARGE NOTE OB - PLAN OF CARE
Continue to take your blood pressure at least twice a day, and take your medications as prescribed.  Call your OB if your pressures are greater than 150/100, or if you experience severe or persistent headaches, visual changes, persistent nausea/vomiting, trouble breathing, chest pain, or severe abdominal pain. After discharge, please stay on pelvic rest for 6 weeks, meaning no sexual intercourse, no tampons and no douching.  No driving for 2 weeks. No lifting objects heavier than baby for two weeks.  Expect to have vaginal bleeding/spotting for up to six weeks.  The bleeding should get lighter and more white/light brown with time.  For bleeding soaking more than a pad an hour or passing clots greater than the size of your fist, come in to the emergency department.    Follow up with your doctor in 2 weeks for incision check.  Call your doctor for noticeable increase in redness or swelling at incision, discharge from incision, or opening of skin at incision site.

## 2024-09-20 NOTE — DISCHARGE NOTE OB - NS MD DC FALL RISK RISK
For information on Fall & Injury Prevention, visit: [email or web address] OR  [email or web address] OR  https://www.cdc.gov/steadi/patient.html

## 2024-09-20 NOTE — OB NEONATOLOGY/PEDIATRICIAN DELIVERY SUMMARY - NSPEDSNEONOTESA_OBGYN_ALL_OB_FT
Baby is a 39.2wk AGA male born to a 24 y/o  mother via C/S . NICU called to delivery for category 2 fetal heart tracing and maternal Mg. Maternal history significant for preeclampsia. Prenatal history uncomplicated. Maternal blood type O+. PNL negative, non-reactive, and immune. GBS positive on , treated with ampicillin x 6doses. AROM at 0837 on , clear fluids. Baby born vigorous and crying spontaneously. Warmed, dried, stimulated. Apgars /. Highest maternal temp 37.1. EOS 0.12. Mom plans to breastfeed & bottlefeed and declines hepB. Circ requested.   BW: 3820  : 9.20.24

## 2024-09-20 NOTE — OB RN DELIVERY SUMMARY - NSSELHIDDEN_OBGYN_ALL_OB_FT
[NS_DeliveryAttending1_OBGYN_ALL_OB_FT:[id number]] [NS_DeliveryAttending1_OBGYN_ALL_OB_FT:[id number]],[NS_DeliveryAssist1_OBGYN_ALL_OB_FT:[id number]==],[NS_DeliveryRN_OBGYN_ALL_OB_FT:[id number]=]

## 2024-09-20 NOTE — DISCHARGE NOTE OB - CARE PLAN
Principal Discharge DX:	 delivery delivered  Assessment and plan of treatment:	After discharge, please stay on pelvic rest for 6 weeks, meaning no sexual intercourse, no tampons and no douching.  No driving for 2 weeks. No lifting objects heavier than baby for two weeks.  Expect to have vaginal bleeding/spotting for up to six weeks.  The bleeding should get lighter and more white/light brown with time.  For bleeding soaking more than a pad an hour or passing clots greater than the size of your fist, come in to the emergency department.    Follow up with your doctor in 2 weeks for incision check.  Call your doctor for noticeable increase in redness or swelling at incision, discharge from incision, or opening of skin at incision site.  Secondary Diagnosis:	Severe preeclampsia  Assessment and plan of treatment:	Continue to take your blood pressure at least twice a day, and take your medications as prescribed.  Call your OB if your pressures are greater than 150/100, or if you experience severe or persistent headaches, visual changes, persistent nausea/vomiting, trouble breathing, chest pain, or severe abdominal pain.   1

## 2024-09-20 NOTE — CHART NOTE - NSCHARTNOTEFT_GEN_A_CORE
C/S Decision Note     Patient has been having intermittent category 2 FHR tracing in the setting of magnesium, prolonged labor/pitocin. Patient continues to have extremely protracted labor course with no cervical change over last two hours. Having very infrequent contractions with pitocin running.   Discussed with patient that despite advanced cervical dilation she remains remote from delivery and that minimal variability is getting more persistent with less response to scalp stim.   Recommended to patient at this time to have pCS for Cat 2 FHR. Risks and benefits discussed. Patient in agreement for C/S.   Anesthesia and charge nurse notified. Preparing to proceed with C/S     Harmeet LEE

## 2024-09-20 NOTE — DISCHARGE NOTE OB - ADDITIONAL INSTRUCTIONS
no discharge, no irritation, no pain, no redness, and no visual changes. F/U within 1 week for B/P check

## 2024-09-20 NOTE — OB RN INTRAOPERATIVE NOTE - NS_ANESTHESIATYPE_OBGYN_ALL_OB
ED Attending Physician Note      Patient : Michelle Giordano Age: 6 month old Sex: female   MRN: [id number] Encounter Date: 2023    History     Chief Complaint   Patient presents with   • Vomiting       HPI    Pt is a 6 month old female with acute vomiting today.  Mom does state that she had roughly 4 episodes of nonbilious nonbloody emesis earlier today, no diarrhea, no fevers.  Patient has no cough or cold symptoms, no rash.  The patient has no lethargy nor inconsolable fussiness.  The patient is now keeping fluids down in the ED.  She normally takes milk roughly 4 ounces per feed.  The patient been wetting diapers well prior to this.    No fever  No cough, rhinorrhea  No diarrhea  No rashes    Appetite decreased  Fluid intake decreased this afternoon  Urine output normal, no dysuria    No sick contacts  Immunizations up to date    Birth history: Full-term, vaginal delivery, no complications      No Known Allergies    There are no discharge medications for this patient.      There are no discharge medications for this patient.      No past medical history on file.    No past surgical history on file.    Family History   Problem Relation Age of Onset   • Diabetes Paternal Great-Grandfather             Review of Systems     Constitutional - Reviewed and are neg except as above  Eyes - Reviewed and are neg except as above  ENT - Reviewed and are neg except as above  Cardiovascular - Reviewed and are neg except as above  Respiratory - Reviewed and are neg except as above  GI - Reviewed and are neg except as above   - Reviewed and are neg except as above  MS - Reviewed and are neg except as above  Skin - Reviewed and are neg except as above  Neuro - Reviewed and are neg except as above  Heme/Lymph - Reviewed and are neg except as above    Physical Exam     Vitals:    11/28/23 1733   BP: 94/53   Pulse: 128   Resp: 40   Temp: 97.5 °F (36.4 °C)   TempSrc: Temporal   SpO2: 100%       GENERAL:  Alert in no acute distress, sitting up in father's lap, tolerating bottle feeds, smiling and interactive  HEENT: Atraumatic, PERRLA, EOMI, no posterior pharyngeal erythema or exudates  NECK: Supple, no prominent cervical lymphadenopathy  LUNGS: CTA bilaterally with no wheezing, no crackles, no retractions, no signs of respiratory distress  HEART: RRR, +S1S2, no murmur, rub, or gallop appreciated  GI: normoactive bowel sounds, non-tender, non-distended, no rebound or guarding, no hepatosplenomegaly  EXT: No cyanosis or edema. No joint swelling or deformity  SKIN: Warm and well perfused. No rashes or bruising  NEURO: No obvious focal neuro deficits, mental status appropriate        MDM                Pt is a 6 month old female with acute vomiting, onset today.  The patient does not appear significantly dehydrated on his physical exam.  The patient is now tolerating oral feeds better.  The patient is not inconsolably fussy nor lethargic.    My differential diagnosis includes but is not limited to acute viral gastritis, acute intestinal obstruction.      Initial Plan:   -Oral feeding challenge.  Patient currently tolerating milk 1 ounce at a time.  The patient's abdominal exam is reassuring at this time.  Observation is ongoing.  -Reassess: pt had tolerated feeding and parents took infant home.  I had spoken with parents re: close f/u with pcp and returning to ED if sx's worsened.  We discussed clear fluids (pedialyte) if pt had persistent emesis, but pt was tolerating milk well.      DISPO: DC    Guardian and or patient verbalized understanding      MDM done in ED Course and as above.        ED Course              Procedures     Procedures    Lab Results     Results for orders placed or performed during the hospital encounter of 11/28/23   GLUCOSE, BEDSIDE - POINT OF CARE   Result Value Ref Range    GLUCOSE, BEDSIDE - POINT OF CARE 92 70 - 99 mg/dL         Radiology Results     Imaging Results    None         ED  Medications     ED Medication Orders (From admission, onward)    None          Critical Care       No Critical Care        Clinical Impression     ED Diagnosis   1. Acute vomiting              Discussed with family regarding patient's diagnosis and all results. All questions answered.    I personally reviewed pertinent: hospital documentation, lab results, imaging studies    Disposition     Discharge 2023  8:08 PM  Michelle Giordano discharge to home/self care.          Follow Up if discharged from the ED:   Vero Lieberman MD  [address] [place] [id number]  [phone]    In 2 days  As needed      New Prescriptions    No medications on file            Hong Casper DO  11/28/23 2008     Epidural

## 2024-09-20 NOTE — OB RN INTRAOPERATIVE NOTE - NSSELHIDDEN_OBGYN_ALL_OB_FT
[NS_DeliveryAttending1_OBGYN_ALL_OB_FT:[id number]],[NS_DeliveryAssist1_OBGYN_ALL_OB_FT:[id number]==]

## 2024-09-20 NOTE — OB PROVIDER DELIVERY SUMMARY - NSPROVIDERDELIVERYNOTE_OBGYN_ALL_OB_FT
Unscheduled pLTCS for Category 2 FHR tracing    Bandl's ring noted above JOY  Viable male  infant  APGARS 9/9, 3820g  Hysterotomy closed in 1 layer using caprosyn  Grossly normal uterus, tubes, and ovaries  Abdomen closed in standard fashion  Pt to recovery in stable condition    QBL: 314  IVF: 1600  UOP: 150    Dictation# [id number]    Attending: BOBY Woo MD  Assistant: CEM Lord NP Unscheduled pLTCS for Category 2 FHR tracing    Bandl's ring noted above JOY  Viable male  infant  APGARS 9/9, 3820g  Hysterotomy closed in 1 layer using caprosyn  Grossly normal uterus, tubes, and ovaries  Abdomen closed in standard fashion  Pt to recovery in stable condition    QBL: 314  IVF: 1600  UOP: 150    Dictation# [id number]    Attending: BOBY Woo MD  Assistant: CEM Lord NP

## 2024-09-21 LAB
BASOPHILS # BLD AUTO: 0.02 K/UL — SIGNIFICANT CHANGE UP (ref 0–0.2)
BASOPHILS NFR BLD AUTO: 0.1 % — SIGNIFICANT CHANGE UP (ref 0–2)
EOSINOPHIL # BLD AUTO: 0.08 K/UL — SIGNIFICANT CHANGE UP (ref 0–0.5)
EOSINOPHIL NFR BLD AUTO: 0.5 % — SIGNIFICANT CHANGE UP (ref 0–6)
HCT VFR BLD CALC: 27.1 % — LOW (ref 34.5–45)
HGB BLD-MCNC: 9.2 G/DL — LOW (ref 11.5–15.5)
IANC: 11.86 K/UL — HIGH (ref 1.8–7.4)
IMM GRANULOCYTES NFR BLD AUTO: 1 % — HIGH (ref 0–0.9)
LYMPHOCYTES # BLD AUTO: 1.8 K/UL — SIGNIFICANT CHANGE UP (ref 1–3.3)
LYMPHOCYTES # BLD AUTO: 12.3 % — LOW (ref 13–44)
MAGNESIUM SERPL-MCNC: 7.4 MG/DL — CRITICAL HIGH (ref 1.6–2.6)
MCHC RBC-ENTMCNC: 30.5 PG — SIGNIFICANT CHANGE UP (ref 27–34)
MCHC RBC-ENTMCNC: 33.9 GM/DL — SIGNIFICANT CHANGE UP (ref 32–36)
MCV RBC AUTO: 89.7 FL — SIGNIFICANT CHANGE UP (ref 80–100)
MONOCYTES # BLD AUTO: 0.74 K/UL — SIGNIFICANT CHANGE UP (ref 0–0.9)
MONOCYTES NFR BLD AUTO: 5.1 % — SIGNIFICANT CHANGE UP (ref 2–14)
NEUTROPHILS # BLD AUTO: 11.86 K/UL — HIGH (ref 1.8–7.4)
NEUTROPHILS NFR BLD AUTO: 81 % — HIGH (ref 43–77)
NRBC # BLD: 0 /100 WBCS — SIGNIFICANT CHANGE UP (ref 0–0)
NRBC # FLD: 0 K/UL — SIGNIFICANT CHANGE UP (ref 0–0)
PLATELET # BLD AUTO: 115 K/UL — LOW (ref 150–400)
RBC # BLD: 3.02 M/UL — LOW (ref 3.8–5.2)
RBC # FLD: 14.1 % — SIGNIFICANT CHANGE UP (ref 10.3–14.5)
WBC # BLD: 14.65 K/UL — HIGH (ref 3.8–10.5)
WBC # FLD AUTO: 14.65 K/UL — HIGH (ref 3.8–10.5)

## 2024-09-21 RX ADMIN — Medication 975 MILLIGRAM(S): at 10:16

## 2024-09-21 RX ADMIN — LABETALOL HYDROCHLORIDE 200 MILLIGRAM(S): 200 TABLET, FILM COATED ORAL at 23:36

## 2024-09-21 RX ADMIN — Medication 600 MILLIGRAM(S): at 23:36

## 2024-09-21 RX ADMIN — Medication 5000 UNIT(S): at 09:44

## 2024-09-21 RX ADMIN — Medication 975 MILLIGRAM(S): at 20:40

## 2024-09-21 RX ADMIN — Medication 975 MILLIGRAM(S): at 15:32

## 2024-09-21 RX ADMIN — Medication 975 MILLIGRAM(S): at 03:24

## 2024-09-21 RX ADMIN — Medication 2 TABLET(S): at 20:40

## 2024-09-21 RX ADMIN — Medication 5000 UNIT(S): at 21:41

## 2024-09-21 RX ADMIN — Medication 975 MILLIGRAM(S): at 15:01

## 2024-09-21 RX ADMIN — Medication 600 MILLIGRAM(S): at 12:50

## 2024-09-21 RX ADMIN — Medication 600 MILLIGRAM(S): at 18:00

## 2024-09-21 RX ADMIN — OXYCODONE HYDROCHLORIDE 5 MILLIGRAM(S): 30 TABLET, FILM COATED, EXTENDED RELEASE ORAL at 03:54

## 2024-09-21 RX ADMIN — Medication 975 MILLIGRAM(S): at 03:54

## 2024-09-21 RX ADMIN — Medication 325 MILLIGRAM(S): at 09:44

## 2024-09-21 RX ADMIN — Medication 975 MILLIGRAM(S): at 21:14

## 2024-09-21 RX ADMIN — Medication 600 MILLIGRAM(S): at 06:23

## 2024-09-21 RX ADMIN — Medication 600 MILLIGRAM(S): at 17:25

## 2024-09-21 RX ADMIN — OXYCODONE HYDROCHLORIDE 5 MILLIGRAM(S): 30 TABLET, FILM COATED, EXTENDED RELEASE ORAL at 03:27

## 2024-09-21 RX ADMIN — LABETALOL HYDROCHLORIDE 200 MILLIGRAM(S): 200 TABLET, FILM COATED ORAL at 05:56

## 2024-09-21 RX ADMIN — LABETALOL HYDROCHLORIDE 200 MILLIGRAM(S): 200 TABLET, FILM COATED ORAL at 14:17

## 2024-09-21 RX ADMIN — Medication 975 MILLIGRAM(S): at 09:44

## 2024-09-21 RX ADMIN — Medication 600 MILLIGRAM(S): at 12:15

## 2024-09-21 RX ADMIN — Medication 1 TABLET(S): at 12:15

## 2024-09-21 RX ADMIN — Medication 600 MILLIGRAM(S): at 05:56

## 2024-09-21 NOTE — PROGRESS NOTE ADULT - SUBJECTIVE AND OBJECTIVE BOX
OB Progress Note:  Delivery, POD#1    S: 25yo POD#1 s/p LTCS . Her pain is well controlled. She is tolerating a regular diet. Not yet passing flatus. Denies N/V. Denies CP/SOB/lightheadedness/dizziness.   She is ambulating without difficulty.   Voiding spontaneously.     O:   Vital Signs Last 24 Hrs  T(C): 36.7 (21 Sep 2024 00:00), Max: 37.1 (20 Sep 2024 06:45)  T(F): 98 (21 Sep 2024 00:00), Max: 98.8 (20 Sep 2024 06:45)  HR: 84 (21 Sep 2024 00:00) (69 - 103)  BP: 108/61 (21 Sep 2024 00:00) (100/69 - 130/94)  BP(mean): 85 (20 Sep 2024 06:00) (85 - 102)  RR: 18 (21 Sep 2024 00:00) (17 - 25)  SpO2: 96% (21 Sep 2024 00:00) (94% - 100%)    Parameters below as of 20 Sep 2024 22:00  Patient On (Oxygen Delivery Method): room air        Labs:  Blood type: O Positive  Rubella IgG: RPR: Negative                          12.8   14.42[H] >-----------< 121[L]    (  @ 02:30 )             39.9    24 @ 02:30      138  |  104  |  10  ----------------------------<  94  4.2   |  18[L]  |  0.60        Ca    8.8      19 Sep 2024 02:30  Mg     7.40[HH]     09-20  Mg     7.50[HH]     09-20  Mg     6.50[H]     09-20  Mg     6.00[H]     09-20  Mg     6.40[H]     -19    TPro  7.0  /  Alb  3.5  /  TBili  0.3  /  DBili  x   /  AST  21  /  ALT  16  /  AlkPhos  383[H]  24 @ 02:30          PE:  General: NAD  Abdomen: Mildly distended, appropriately tender, incision c/d/i.  Extremities: No erythema, no pitting edema     OB Progress Note:  Delivery, POD#1    S: 23yo POD#1 s/p LTCS . Her pain is well controlled. [place] recently removed so patient not yet voiding spontaneously or OOB. She is tolerating a regular diet. Not yet passing flatus. Denies N/V. Denies CP/SOB/lightheadedness/dizziness.     O:   Vital Signs Last 24 Hrs  T(C): 36.7 (21 Sep 2024 00:00), Max: 37.1 (20 Sep 2024 06:45)  T(F): 98 (21 Sep 2024 00:00), Max: 98.8 (20 Sep 2024 06:45)  HR: 84 (21 Sep 2024 00:00) (69 - 103)  BP: 108/61 (21 Sep 2024 00:00) (100/69 - 130/94)  BP(mean): 85 (20 Sep 2024 06:00) (85 - 102)  RR: 18 (21 Sep 2024 00:00) (17 - 25)  SpO2: 96% (21 Sep 2024 00:00) (94% - 100%)    Parameters below as of 20 Sep 2024 22:00  Patient On (Oxygen Delivery Method): room air        Labs:  Blood type: O Positive  Rubella IgG: RPR: Negative                          12.8   14.42[H] >-----------< 121[L]    (  @ 02:30 )             39.9    24 @ 02:30      138  |  104  |  10  ----------------------------<  94  4.2   |  18[L]  |  0.60        Ca    8.8      19 Sep 2024 02:30  Mg     7.40[HH]     09-20  Mg     7.50[HH]     09-20  Mg     6.50[H]     09-20  Mg     6.00[H]     09-20  Mg     6.40[H]     -19    TPro  7.0  /  Alb  3.5  /  TBili  0.3  /  DBili  x   /  AST  21  /  ALT  16  /  AlkPhos  383[H]  24 @ 02:30          PE:  General: NAD  Abdomen: Mildly distended, appropriately tender, incision c/d/i.  Extremities: No erythema, no pitting edema

## 2024-09-21 NOTE — PROGRESS NOTE ADULT - SUBJECTIVE AND OBJECTIVE BOX
ANESTHESIA POSTOP CHECK    24y Female POSTOP DAY 1 S/P       NO APPARENT ANESTHESIA COMPLICATIONS

## 2024-09-21 NOTE — PROGRESS NOTE ADULT - ASSESSMENT
A/P: 25yo POD#1 s/p pLTCS c/b sPEC.  Patient is stable and doing well post-operatively.    #sPEC  - BPs ON well controlled  - denies severe features   - baseline HELLP labs wnl; P/C 0.4  - s/p Mg, Labetalol 20  - c/w Labetalol 200 TID  - continue to monitor     #postpartum  - Continue regular diet.  - Increase ambulation.  - Continue motrin, tylenol, oxycodone PRN for pain control.  - F/u AM CBC    Britta Ospina PGY1   A/P: 23yo POD#1 s/p pLTCS c/b sPEC.  Patient is stable and doing well post-operatively.      #sPEC  - BPs overnight 100s-120s/60s-80s  - denies severe features   - baseline HELLP labs wnl; P/C 0.4  - s/p Mg, Labetalol 20  - c/w Labetalol 200 TID  - continue to monitor     #postpartum  - Continue regular diet.  - Increase ambulation.  - Continue motrin, tylenol, oxycodone PRN for pain control.  - F/u AM CBC    Britta Ospina PGY1

## 2024-09-22 RX ADMIN — Medication 600 MILLIGRAM(S): at 12:20

## 2024-09-22 RX ADMIN — LABETALOL HYDROCHLORIDE 200 MILLIGRAM(S): 200 TABLET, FILM COATED ORAL at 22:46

## 2024-09-22 RX ADMIN — Medication 5000 UNIT(S): at 12:20

## 2024-09-22 RX ADMIN — Medication 975 MILLIGRAM(S): at 14:52

## 2024-09-22 RX ADMIN — Medication 600 MILLIGRAM(S): at 20:19

## 2024-09-22 RX ADMIN — LABETALOL HYDROCHLORIDE 200 MILLIGRAM(S): 200 TABLET, FILM COATED ORAL at 14:52

## 2024-09-22 RX ADMIN — Medication 600 MILLIGRAM(S): at 00:15

## 2024-09-22 RX ADMIN — Medication 975 MILLIGRAM(S): at 02:45

## 2024-09-22 RX ADMIN — Medication 600 MILLIGRAM(S): at 06:14

## 2024-09-22 RX ADMIN — Medication 975 MILLIGRAM(S): at 08:21

## 2024-09-22 RX ADMIN — Medication 600 MILLIGRAM(S): at 13:00

## 2024-09-22 RX ADMIN — Medication 975 MILLIGRAM(S): at 03:29

## 2024-09-22 RX ADMIN — Medication 325 MILLIGRAM(S): at 12:20

## 2024-09-22 RX ADMIN — Medication 600 MILLIGRAM(S): at 06:44

## 2024-09-22 RX ADMIN — Medication 975 MILLIGRAM(S): at 09:21

## 2024-09-22 RX ADMIN — Medication 1 TABLET(S): at 12:20

## 2024-09-22 RX ADMIN — LABETALOL HYDROCHLORIDE 200 MILLIGRAM(S): 200 TABLET, FILM COATED ORAL at 07:18

## 2024-09-22 RX ADMIN — Medication 80 MILLIGRAM(S): at 22:47

## 2024-09-22 RX ADMIN — Medication 600 MILLIGRAM(S): at 20:58

## 2024-09-22 NOTE — PROGRESS NOTE ADULT - SUBJECTIVE AND OBJECTIVE BOX
OB Progress Note:  Delivery, POD#2    S: 25yo POD#2 s/p LTCS . Her pain is well controlled. She is tolerating a regular diet. Patient is passing flatus. Denies N/V. Denies CP/SOB/lightheadedness/dizziness.  She is ambulating without difficulty.   Voiding spontaneously.     O:   Vital Signs Last 24 Hrs  T(C): 37.7 (22 Sep 2024 02:00), Max: 37.7 (22 Sep 2024 02:00)  T(F): 99.9 (22 Sep 2024 02:00), Max: 99.9 (22 Sep 2024 02:00)  HR: 77 (22 Sep 2024 02:00) (63 - 86)  BP: 118/72 (22 Sep 2024 02:00) (112/74 - 135/83)  BP(mean): --  RR: 18 (22 Sep 2024 02:00) (18 - 18)  SpO2: 98% (22 Sep 2024 02:) (98% - 100%)    Parameters below as of 22 Sep 2024 02:00  Patient On (Oxygen Delivery Method): room air        Labs:  Blood type: O Positive  Rubella IgG: RPR: Negative                          9.2[L]   14.65[H] >-----------< 115[L]    (  @ 05:30 )             27.1[L]        Mg     7.40[HH]     09-20  Mg     7.50[HH]     09-20  Mg     6.50[H]     09-20  Mg     6.00[H]     09-20  Mg     6.40[H]     09-19            Physical exam:  Gen: NAD  Abdomen: Soft, approproiately tender, Mildly distended, firm uterine fundus at umbilicus.  Incision: Clean, dry, and intact   Pelvic: Normal lochia noted  Ext: No calf tenderness, no erythema, no pitting edema   OB Progress Note:  Delivery, POD#2    S: 25yo POD#2 s/p LTCS . Her pain is well controlled. She is tolerating a regular diet. Patient is passing flatus. Denies N/V. Denies CP/SOB/lightheadedness/dizziness.  She is ambulating without difficulty.   Voiding spontaneously.     O:   Vital Signs Last 24 Hrs  T(C): 37.7 (22 Sep 2024 02:00), Max: 37.7 (22 Sep 2024 02:00)  T(F): 99.9 (22 Sep 2024 02:00), Max: 99.9 (22 Sep 2024 02:00)  HR: 77 (22 Sep 2024 02:00) (63 - 86)  BP: 118/72 (22 Sep 2024 02:00) (112/74 - 135/83)  BP(mean): --  RR: 18 (22 Sep 2024 02:00) (18 - 18)  SpO2: 98% (22 Sep 2024 02:) (98% - 100%)    Parameters below as of 22 Sep 2024 02:00  Patient On (Oxygen Delivery Method): room air        Labs:  Blood type: O Positive  Rubella IgG: RPR: Negative                          9.2[L]   14.65[H] >-----------< 115[L]    ( - @ 05:30 )             27.1[L]        Mg     7.40[HH]     09-20  Mg     7.50[HH]     09-20  Mg     6.50[H]     09-20  Mg     6.00[H]     09-20  Mg     6.40[H]     09-19            Physical exam:  Gen: NAD  Abdomen: Soft, appropriately tender, Mildly distended, firm uterine fundus at umbilicus.  Incision: Clean, dry, and intact   Pelvic: Normal lochia noted  Ext: No calf tenderness, no erythema, no pitting edema

## 2024-09-22 NOTE — CHART NOTE - NSCHARTNOTEFT_GEN_A_CORE
H/H reviewed. No need to repeat labs at this time.     D/w Dr. Trye A Sacks, PGY1 conservative magnemtn  abx per surgery  no acute complatins

## 2024-09-22 NOTE — PROGRESS NOTE ADULT - ASSESSMENT
A/P: 25yo POD#2 s/p pLTCS for cat2 tracing c/b sPEC.  Patient is stable and doing well post-operatively.      #sPEC  - BPs overnight 110s-130s/70s-80s  - denies severe features   - baseline HELLP labs wnl; P/C 0.4  - s/p Mg, Labetalol 20  - c/w Labetalol 200 TID  - BP log given   - continue to monitor     #postpartum  - QBL: 314  - Hct: 39.9->27.1  - Continue regular diet.  - Increase ambulation.  - Continue motrin, tylenol, oxycodone PRN for pain control.    A Sacks, PGY1

## 2024-09-22 NOTE — PROGRESS NOTE ADULT - ATTENDING COMMENTS
Pt seen and examined and agree with above assessment and plan.
pt seen and examined this am   reports mild headache, denies any other PEC symptoms   continue plan as above

## 2024-09-23 VITALS
DIASTOLIC BLOOD PRESSURE: 79 MMHG | RESPIRATION RATE: 18 BRPM | SYSTOLIC BLOOD PRESSURE: 123 MMHG | HEART RATE: 81 BPM | TEMPERATURE: 98 F | OXYGEN SATURATION: 100 %

## 2024-09-23 RX ORDER — LABETALOL HYDROCHLORIDE 200 MG/1
1 TABLET, FILM COATED ORAL
Qty: 90 | Refills: 0
Start: 2024-09-23 | End: 2024-10-22

## 2024-09-23 RX ORDER — FERROUS SULFATE 325(65) MG
1 TABLET ORAL
Qty: 0 | Refills: 0 | DISCHARGE
Start: 2024-09-23

## 2024-09-23 RX ORDER — ACETAMINOPHEN 325 MG
3 TABLET ORAL
Qty: 0 | Refills: 0 | DISCHARGE
Start: 2024-09-23

## 2024-09-23 RX ORDER — FERROUS SULFATE 325(65) MG
0 TABLET ORAL
Refills: 0 | DISCHARGE

## 2024-09-23 RX ADMIN — Medication 5000 UNIT(S): at 11:31

## 2024-09-23 RX ADMIN — Medication 975 MILLIGRAM(S): at 09:10

## 2024-09-23 RX ADMIN — Medication 600 MILLIGRAM(S): at 06:26

## 2024-09-23 RX ADMIN — Medication 325 MILLIGRAM(S): at 11:31

## 2024-09-23 RX ADMIN — Medication 975 MILLIGRAM(S): at 01:15

## 2024-09-23 RX ADMIN — Medication 5000 UNIT(S): at 00:43

## 2024-09-23 RX ADMIN — Medication 1 TABLET(S): at 11:30

## 2024-09-23 RX ADMIN — LABETALOL HYDROCHLORIDE 200 MILLIGRAM(S): 200 TABLET, FILM COATED ORAL at 13:38

## 2024-09-23 RX ADMIN — Medication 600 MILLIGRAM(S): at 12:15

## 2024-09-23 RX ADMIN — LABETALOL HYDROCHLORIDE 200 MILLIGRAM(S): 200 TABLET, FILM COATED ORAL at 06:36

## 2024-09-23 RX ADMIN — Medication 975 MILLIGRAM(S): at 00:42

## 2024-09-23 RX ADMIN — Medication 975 MILLIGRAM(S): at 08:26

## 2024-09-23 RX ADMIN — Medication 600 MILLIGRAM(S): at 11:31

## 2024-09-23 RX ADMIN — Medication 600 MILLIGRAM(S): at 05:16

## 2024-09-23 NOTE — PROGRESS NOTE ADULT - ASSESSMENT
A/P: 23yo POD#3 s/p pLTCS for cat2 tracing c/b sPEC.  Patient is stable and doing well post-operatively.      #sPEC  - denies severe features   - baseline HELLP labs wnl; P/C 0.4  - s/p Mg, Labetalol 20  - c/w Labetalol 200 TID  - BP log given   - continue to monitor     #postpartum  - QBL: 314  - Hct: 39.9->27.1  - Continue regular diet.  - Increase ambulation.  - Continue motrin, tylenol, oxycodone PRN for pain control.    Britta Ospina PGY1

## 2024-09-23 NOTE — PROGRESS NOTE ADULT - SUBJECTIVE AND OBJECTIVE BOX
OB Postpartum Note:  Delivery, POD#3    S: 25yo POD#3 s/p LTCS. The patient feels well.  Pain is well controlled. She is tolerating a regular diet and passing flatus. She is voiding spontaneously, and ambulating without difficulty. Denies headache, CP/SOB. Denies lightheadedness/dizziness. Denies N/V.    O:  Vitals:  Vital Signs Last 24 Hrs  T(C): 37.1 (23 Sep 2024 05:52), Max: 37.1 (22 Sep 2024 22:30)  T(F): 98.7 (23 Sep 2024 05:52), Max: 98.8 (22 Sep 2024 22:30)  HR: 70 (23 Sep 2024 05:52) (69 - 96)  BP: 118/88 (23 Sep 2024 05:52) (118/88 - 133/94)  BP(mean): --  RR: 18 (23 Sep 2024 05:52) (18 - 18)  SpO2: 100% (23 Sep 2024 05:52) (98% - 100%)    Parameters below as of 22 Sep 2024 22:30  Patient On (Oxygen Delivery Method): room air        MEDICATIONS  (STANDING):  acetaminophen     Tablet .. 975 milliGRAM(s) Oral <User Schedule>  diphtheria/tetanus/pertussis (acellular) Vaccine (Adacel) 0.5 milliLiter(s) IntraMuscular once  ferrous    sulfate 325 milliGRAM(s) Oral daily  heparin   Injectable 5000 Unit(s) SubCutaneous every 12 hours  ibuprofen  Tablet. 600 milliGRAM(s) Oral every 6 hours  influenza   Vaccine 0.5 milliLiter(s) IntraMuscular once  labetalol 200 milliGRAM(s) Oral every 8 hours  lactated ringers Bolus 500 milliLiter(s) IV Bolus once  lactated ringers Bolus 500 milliLiter(s) IV Bolus once  prenatal multivitamin 1 Tablet(s) Oral daily  senna 2 Tablet(s) Oral at bedtime    MEDICATIONS  (PRN):  diphenhydrAMINE 25 milliGRAM(s) Oral every 6 hours PRN Pruritus  lanolin Ointment 1 Application(s) Topical every 6 hours PRN Sore Nipples  magnesium hydroxide Suspension 30 milliLiter(s) Oral two times a day PRN Constipation  oxyCODONE    IR 5 milliGRAM(s) Oral once PRN Moderate to Severe Pain (4-10)  oxyCODONE    IR 5 milliGRAM(s) Oral every 3 hours PRN Moderate to Severe Pain (4-10)  simethicone 80 milliGRAM(s) Chew every 4 hours PRN Gas      LABS:  Blood type: O Positive  Rubella IgG: RPR: Negative                          9.2[L]   14.65[H] >-----------< 115[L]    (  @ 05:30 )             27.1[L]        Mg     7.40[HH]     09-20  Mg     7.50[HH]     09-20  Mg     6.50[H]     09-20            Physical exam:  Gen: NAD  Abdomen: Soft, nontender, no distension , firm uterine fundus at umbilicus.  Incision: Clean, dry, and intact   Pelvic: Normal lochia noted  Ext: No calf tenderness

## 2024-09-24 ENCOUNTER — APPOINTMENT (OUTPATIENT)
Dept: ANTEPARTUM | Facility: CLINIC | Age: 24
End: 2024-09-24

## 2024-09-24 ENCOUNTER — APPOINTMENT (OUTPATIENT)
Dept: OBGYN | Facility: CLINIC | Age: 24
End: 2024-09-24

## 2024-09-24 ENCOUNTER — TRANSCRIPTION ENCOUNTER (OUTPATIENT)
Age: 24
End: 2024-09-24

## 2024-09-30 PROBLEM — N75.0 CYST OF BARTHOLIN'S GLAND: Chronic | Status: ACTIVE | Noted: 2024-09-19

## 2024-10-01 ENCOUNTER — TRANSCRIPTION ENCOUNTER (OUTPATIENT)
Age: 24
End: 2024-10-01

## 2024-10-11 ENCOUNTER — APPOINTMENT (OUTPATIENT)
Dept: OBGYN | Facility: CLINIC | Age: 24
End: 2024-10-11
Payer: COMMERCIAL

## 2024-10-11 VITALS
SYSTOLIC BLOOD PRESSURE: 127 MMHG | BODY MASS INDEX: 20.73 KG/M2 | WEIGHT: 117 LBS | HEIGHT: 63 IN | DIASTOLIC BLOOD PRESSURE: 85 MMHG

## 2024-10-11 PROCEDURE — 0503F POSTPARTUM CARE VISIT: CPT

## 2024-11-08 ENCOUNTER — APPOINTMENT (OUTPATIENT)
Dept: OBGYN | Facility: CLINIC | Age: 24
End: 2024-11-08

## 2024-11-25 ENCOUNTER — TRANSCRIPTION ENCOUNTER (OUTPATIENT)
Age: 24
End: 2024-11-25

## 2024-11-25 ENCOUNTER — APPOINTMENT (OUTPATIENT)
Dept: OBGYN | Facility: CLINIC | Age: 24
End: 2024-11-25
Payer: COMMERCIAL

## 2024-11-25 VITALS — DIASTOLIC BLOOD PRESSURE: 82 MMHG | BODY MASS INDEX: 19.66 KG/M2 | WEIGHT: 111 LBS | SYSTOLIC BLOOD PRESSURE: 138 MMHG

## 2024-11-25 DIAGNOSIS — Z00.00 ENCOUNTER FOR GENERAL ADULT MEDICAL EXAMINATION W/OUT ABNORMAL FINDINGS: ICD-10-CM

## 2024-11-25 PROCEDURE — 99214 OFFICE O/P EST MOD 30 MIN: CPT

## 2025-04-14 NOTE — REVIEW OF SYSTEMS
[Negative] : Heme/Lymph Spine appears normal, range of motion is not limited, + ttp left chest wall no crepitus, + ttp lefty hand neurovasc intact [Parents] : parents [Fruit] : fruit [Vegetables] : vegetables [Cereal] : cereal [Eggs] : eggs [Peanut] : peanut [Dairy] : dairy [Baby food] : baby food [Finger foods] : finger foods [Water] : water [Normal] : Normal [___ voids per day] : [unfilled] voids per day [Frequency of stools: ___] : Frequency of stools: [unfilled]  stools [per day] : per day. [In Crib] : sleeps in crib [Co-sleeping] : co-sleeping [Sleeps 12-16 hours per 24 hours (including naps)] : sleeps 12-16 hours per 24 hours (including naps) [Sippy Cup use] : sippy cup use [No] : Not at  exposure [Rear facing car seat in  back seat] : Rear facing car seat in  back seat [Carbon Monoxide Detectors] : Carbon monoxide detectors [Smoke Detectors] : Smoke detectors [Formula ___ oz/feed] : [unfilled] oz of formula per feed [Meat] : meat [Tap water] : Primary Fluoride Source: Tap water [Up to date] : Up to date [Fish] : no fish [Wakes up at night] : wakes up at night [Bottle in bed] : bottle in bed [Brushing teeth] : not brushing teeth [Screen time only for video chatting] : screen time only for video chatting

## 2025-05-27 ENCOUNTER — TRANSCRIPTION ENCOUNTER (OUTPATIENT)
Age: 25
End: 2025-05-27

## (undated) DEVICE — DRSG DERMABOND PRINEO 22CM